# Patient Record
Sex: MALE | Race: OTHER | Employment: OTHER | URBAN - METROPOLITAN AREA
[De-identification: names, ages, dates, MRNs, and addresses within clinical notes are randomized per-mention and may not be internally consistent; named-entity substitution may affect disease eponyms.]

---

## 2017-02-07 ENCOUNTER — TELEPHONE (OUTPATIENT)
Dept: NEUROLOGY | Facility: CLINIC | Age: 67
End: 2017-02-07

## 2017-02-07 ENCOUNTER — OFFICE VISIT (OUTPATIENT)
Dept: NEUROLOGY | Facility: CLINIC | Age: 67
End: 2017-02-07

## 2017-02-07 VITALS
BODY MASS INDEX: 30.21 KG/M2 | SYSTOLIC BLOOD PRESSURE: 120 MMHG | RESPIRATION RATE: 16 BRPM | WEIGHT: 211 LBS | HEIGHT: 70 IN | DIASTOLIC BLOOD PRESSURE: 74 MMHG

## 2017-02-07 DIAGNOSIS — G44.86 CERVICOGENIC HEADACHE: Primary | ICD-10-CM

## 2017-02-07 PROCEDURE — 99213 OFFICE O/P EST LOW 20 MIN: CPT | Performed by: OTHER

## 2017-02-07 RX ORDER — ALFUZOSIN HYDROCHLORIDE 10 MG/1
TABLET, EXTENDED RELEASE ORAL NIGHTLY
Refills: 1 | COMMUNITY
Start: 2017-01-18

## 2017-02-07 RX ORDER — TIZANIDINE 2 MG/1
2 TABLET ORAL 3 TIMES DAILY
Qty: 90 TABLET | Refills: 0 | Status: SHIPPED | OUTPATIENT
Start: 2017-02-07 | End: 2017-05-08

## 2017-02-07 RX ORDER — SUMATRIPTAN 100 MG/1
TABLET, FILM COATED ORAL
Refills: 0 | COMMUNITY
Start: 2017-02-06 | End: 2017-02-07

## 2017-02-07 NOTE — PATIENT INSTRUCTIONS
Refill policies:    • Allow 2 business days for refills; controlled substances may take longer. • Contact our office at least 5 days prior to running out of medication or submit request through the “request refill” option in your [employer] account.   • Ref have a procedure or additional testing performed. [facility]) will contact your insurance carrier to obtain pre-certification or prior authorization.     Unfortunately, [facility] has seen an increase in denial of payment even though the p

## 2017-02-07 NOTE — TELEPHONE ENCOUNTER
[address]. for authorization of approval of Botox 200 units x 1 vial.  Talked to [address] [place] [address]. who states Botox is not on their formulary and is not a covered benefit under Part D.(Part B has covered Botox 200 units  and cpt code 13446 in the

## 2017-02-07 NOTE — PROGRESS NOTES
[facility]   Follow-Up Exam Note  Twin Castro MD        Patient is here for follow-up of headaches. Unfortunately after several rounds of botulinum toxin he continues to have posterior headaches.   He tells me that he gets relief wit

## 2017-05-08 ENCOUNTER — OFFICE VISIT (OUTPATIENT)
Dept: NEUROLOGY | Facility: CLINIC | Age: 67
End: 2017-05-08

## 2017-05-08 VITALS
WEIGHT: 203 LBS | HEIGHT: 70 IN | RESPIRATION RATE: 16 BRPM | SYSTOLIC BLOOD PRESSURE: 124 MMHG | HEART RATE: 84 BPM | DIASTOLIC BLOOD PRESSURE: 76 MMHG | BODY MASS INDEX: 29.06 KG/M2

## 2017-05-08 DIAGNOSIS — G44.221 CHRONIC TENSION-TYPE HEADACHE, INTRACTABLE: Primary | ICD-10-CM

## 2017-05-08 PROCEDURE — 99213 OFFICE O/P EST LOW 20 MIN: CPT | Performed by: OTHER

## 2017-05-08 RX ORDER — BUTALBITAL, ACETAMINOPHEN AND CAFFEINE 50; 325; 40 MG/1; MG/1; MG/1
1 TABLET ORAL EVERY 6 HOURS PRN
Qty: 60 TABLET | Refills: 5 | Status: SHIPPED | OUTPATIENT
Start: 2017-05-08 | End: 2017-06-07

## 2017-05-08 RX ORDER — CYCLOBENZAPRINE HCL 5 MG
5 TABLET ORAL NIGHTLY
Qty: 30 TABLET | Refills: 12 | Status: SHIPPED | OUTPATIENT
Start: 2017-05-08 | End: 2018-05-31

## 2017-05-08 NOTE — PROGRESS NOTES
Doc Debra : 10/7/1950     HPI:   Patient presents with:  Headache: LOV: 17 with Dr. Arjun Bernardo. F/U on headaches. Patient states that he received 2 rounds of botox without any improvement in headaches.  He states that he had some relief with pain Erbumine 4 MG Oral Tab Take 4 mg by mouth daily. Disp:  Rfl:    JANUVIA 100 MG Oral Tab 1 tablet daily. Disp:  Rfl: 4     No current facility-administered medications for this visit.    Past Medical History   Diagnosis Date   • Type II or unspecified type d visual fields. Pupil react to light. Fundoscopic exam normal.  III,IV,VI- EOM full, with normal pursuit. V-Facial sensation intact, with symmetric corneal reflex. VII- face symmetric. VIII- Auditory acuity symmetric.     Motor: 5/5 strength in the upper an

## 2017-05-10 ENCOUNTER — TELEPHONE (OUTPATIENT)
Dept: NEUROLOGY | Facility: CLINIC | Age: 67
End: 2017-05-10

## 2017-06-05 ENCOUNTER — HOSPITAL ENCOUNTER (EMERGENCY)
Facility: HOSPITAL | Age: 67
Discharge: HOME | End: 2017-06-05
Attending: EMERGENCY MEDICINE
Payer: MEDICARE

## 2017-06-05 VITALS
WEIGHT: 205 LBS | BODY MASS INDEX: 29.35 KG/M2 | SYSTOLIC BLOOD PRESSURE: 147 MMHG | DIASTOLIC BLOOD PRESSURE: 87 MMHG | HEIGHT: 70 IN | RESPIRATION RATE: 16 BRPM | HEART RATE: 80 BPM | OXYGEN SATURATION: 98 %

## 2017-06-05 DIAGNOSIS — G89.29 CHRONIC INTRACTABLE HEADACHE, UNSPECIFIED HEADACHE TYPE: ICD-10-CM

## 2017-06-05 DIAGNOSIS — I10 ESSENTIAL HYPERTENSION: Primary | ICD-10-CM

## 2017-06-05 DIAGNOSIS — R51.9 CHRONIC INTRACTABLE HEADACHE, UNSPECIFIED HEADACHE TYPE: ICD-10-CM

## 2017-06-05 PROCEDURE — 36415 COLL VENOUS BLD VENIPUNCTURE: CPT

## 2017-06-05 PROCEDURE — 99283 EMERGENCY DEPT VISIT LOW MDM: CPT

## 2017-06-05 PROCEDURE — 80048 BASIC METABOLIC PNL TOTAL CA: CPT | Performed by: EMERGENCY MEDICINE

## 2017-06-05 PROCEDURE — 85025 COMPLETE CBC W/AUTO DIFF WBC: CPT | Performed by: EMERGENCY MEDICINE

## 2017-06-05 RX ORDER — AMLODIPINE BESYLATE 2.5 MG/1
2.5 TABLET ORAL DAILY
Qty: 30 TABLET | Refills: 0 | Status: SHIPPED | OUTPATIENT
Start: 2017-06-05 | End: 2017-07-05

## 2017-06-05 NOTE — ED INITIAL ASSESSMENT (HPI)
Pt presents to ED with a c/o \"high blood pressure\" since yesterday. Pt reports taking perindopril 4 mg at 0000 and another 4 mg dose at 0600. Pt states SBP was in the 170's. Pt currently c/o headache, denies feeling dizzy.

## 2017-06-05 NOTE — ED PROVIDER NOTES
Patient Seen in: [facility] AND [facility] Emergency Department    History   Patient presents with:  Hypertension (cardiovascular)    Stated Complaint: HTN    HPI    The patient is a 70-year-old male with a history of chronic daily headaches and hypertension.   Desiree Purpura agreed except as otherwise stated in HPI.     Physical Exam     ED Triage Vitals   BP 06/05/17 0816 165/95 mmHg   Pulse 06/05/17 0816 80   Resp 06/05/17 0816 98   Temp --    Temp src 06/05/17 0816 Temporal   SpO2 06/05/17 0816 98 %   O2 Device 06/05/17 0816 Abnormality         Status                     ---------                               -----------         ------                     CBC W/ DIFFERENTIAL[[id number]]                              Final result                 Please view resu

## 2017-12-06 ENCOUNTER — OFFICE VISIT (OUTPATIENT)
Dept: NEUROLOGY | Facility: CLINIC | Age: 67
End: 2017-12-06

## 2017-12-06 ENCOUNTER — TELEPHONE (OUTPATIENT)
Dept: NEUROLOGY | Facility: CLINIC | Age: 67
End: 2017-12-06

## 2017-12-06 VITALS
DIASTOLIC BLOOD PRESSURE: 70 MMHG | BODY MASS INDEX: 29.63 KG/M2 | HEART RATE: 76 BPM | RESPIRATION RATE: 16 BRPM | WEIGHT: 207 LBS | SYSTOLIC BLOOD PRESSURE: 106 MMHG | HEIGHT: 70 IN

## 2017-12-06 DIAGNOSIS — G44.221 CHRONIC TENSION-TYPE HEADACHE, INTRACTABLE: Primary | ICD-10-CM

## 2017-12-06 PROCEDURE — 99214 OFFICE O/P EST MOD 30 MIN: CPT | Performed by: OTHER

## 2017-12-06 PROCEDURE — 64405 NJX AA&/STRD GR OCPL NRV: CPT | Performed by: OTHER

## 2017-12-06 RX ORDER — TRIAMCINOLONE ACETONIDE 40 MG/ML
40 INJECTION, SUSPENSION INTRA-ARTICULAR; INTRAMUSCULAR ONCE
Status: COMPLETED | OUTPATIENT
Start: 2017-12-06 | End: 2017-12-06

## 2017-12-06 RX ORDER — LIDOCAINE HYDROCHLORIDE 10 MG/ML
1 INJECTION, SOLUTION INFILTRATION; PERINEURAL ONCE
Status: COMPLETED | OUTPATIENT
Start: 2017-12-06 | End: 2017-12-06

## 2017-12-06 RX ORDER — ERGOCALCIFEROL 1.25 MG/1
1 CAPSULE ORAL
Refills: 0 | COMMUNITY
Start: 2017-11-13 | End: 2020-02-26

## 2017-12-06 RX ORDER — AMLODIPINE BESYLATE 5 MG/1
1 TABLET ORAL DAILY
Refills: 1 | COMMUNITY
Start: 2017-09-14

## 2017-12-06 NOTE — TELEPHONE ENCOUNTER
Medicare Online for insurance coverage of  bilateral occipital nerve block cpt code 79302. Insurance was verified and procedure is a covered benefit and does not require authorization. Will inform Nursing.

## 2017-12-06 NOTE — PROGRESS NOTES
Neurology Follow up Visit     Referred By: Dr. Ramirez ref. provider found    Chief Complaint: Patient presents with:  Headache: LOV: 5/8/17 with Dr. Ivar Councilman. F/U on migraines. Patient states that his headaches are about the same.  He states that he gets 1-2 without improvement. He has been on a variety of medications including Zanaflex, Cymbalta, gabapentin, Elavil, and Depakote in the past.  Topamax was not tried because he does have a history of kidney stones. He denies any focal neurologic complaints. no cyanosis or edema     Neurological:     Mental Status- Alert and oriented x3.   Normal attention span and concentration  Thought process intact  Memory intact- recent and remote  Mood intact  Fund of knowledge appropriate for education and age    Iván Mathis degenerative changes as compared to 2014    Assessment   1. Chronic tension-type headache, intractable  Since the could be some occipital neuralgia component, occipital nerve blocks were performed bilaterally.   I did greater occipital nerve block on both s

## 2018-02-05 ENCOUNTER — TELEPHONE (OUTPATIENT)
Dept: NEUROLOGY | Facility: CLINIC | Age: 68
End: 2018-02-05

## 2018-03-06 ENCOUNTER — OFFICE VISIT (OUTPATIENT)
Dept: NEUROLOGY | Facility: CLINIC | Age: 68
End: 2018-03-06

## 2018-03-06 VITALS
WEIGHT: 207 LBS | RESPIRATION RATE: 16 BRPM | HEART RATE: 85 BPM | SYSTOLIC BLOOD PRESSURE: 100 MMHG | BODY MASS INDEX: 29.63 KG/M2 | HEIGHT: 70 IN | DIASTOLIC BLOOD PRESSURE: 64 MMHG

## 2018-03-06 DIAGNOSIS — G43.719 INTRACTABLE CHRONIC MIGRAINE WITHOUT AURA AND WITHOUT STATUS MIGRAINOSUS: ICD-10-CM

## 2018-03-06 DIAGNOSIS — M54.81 BILATERAL OCCIPITAL NEURALGIA: Primary | ICD-10-CM

## 2018-03-06 PROCEDURE — 99213 OFFICE O/P EST LOW 20 MIN: CPT | Performed by: OTHER

## 2018-03-06 RX ORDER — PREGABALIN 75 MG/1
75 CAPSULE ORAL 2 TIMES DAILY
Qty: 60 CAPSULE | Refills: 5 | Status: SHIPPED | OUTPATIENT
Start: 2018-03-06 | End: 2018-04-05

## 2018-03-06 RX ORDER — SAXAGLIPTIN 5 MG/1
TABLET, FILM COATED ORAL
Refills: 1 | COMMUNITY
Start: 2018-02-18

## 2018-03-06 NOTE — PATIENT INSTRUCTIONS
As of October 6th 2014, the Drug Enforcement Agency [facility]) is reclassifying all hydrocodone combination medications from Schedule III to Schedule II. This includes medications such as Norco, Vicodin, Lortab, Zohydro, and Vicoprofen.     What this means for y

## 2018-03-06 NOTE — PROGRESS NOTES
Francis Fagan : 10/7/1950     HPI:   Patient presents with:  Headache: LOV: 17. Patient presents for a f/u headaches. Patient states there has not been any changes for his headaches. He states he now has a couple headaches a day.  Denies a head every 7 days. Disp:  Rfl: 0   Cyclobenzaprine HCl 5 MG Oral Tab Take 1 tablet (5 mg total) by mouth nightly.  Disp: 30 tablet Rfl: 12   Alfuzosin HCl ER 10 MG Oral Tablet 24 Hr TK 1 T PO D Disp:  Rfl: 1   Perindopril Erbumine 4 MG Oral Tab Take 4 mg by mout extremities  PSYCHE:no depression or anxiety  NEURO: As in HPI    EXAM:     Vitals:  /64 (BP Location: Right arm, Patient Position: Sitting, Cuff Size: adult)   Pulse 85   Resp 16   Ht 70\"   Wt 207 lb   BMI 29.70 kg/m²    General appearance:  In no d

## 2018-05-31 RX ORDER — CYCLOBENZAPRINE HCL 5 MG
TABLET ORAL
Qty: 30 TABLET | Refills: 5 | Status: SHIPPED | OUTPATIENT
Start: 2018-05-31 | End: 2020-02-26

## 2018-05-31 NOTE — TELEPHONE ENCOUNTER
Refill request for cyclobenzaprine 5 mg, take 1 tab nightly, #30, 5 refills    LOV: 3/6/18  NOV: None

## 2018-06-04 NOTE — TELEPHONE ENCOUNTER
PA for fioricet completed through cover my meds. Determination will be faxed to office within 72 hrs. Tried calling patient back, could not reach patient. Patient left message on PSR phone to see if he could talk to a nurse about having urinary issues as well as make an appointment.  Please call patient to advise.

## 2020-01-24 PROBLEM — K43.6 VENTRAL HERNIA WITH OBSTRUCTION AND WITHOUT GANGRENE: Status: ACTIVE | Noted: 2020-01-24

## 2020-01-24 PROBLEM — K40.20 NON-RECURRENT BILATERAL INGUINAL HERNIA WITHOUT OBSTRUCTION OR GANGRENE: Status: ACTIVE | Noted: 2020-01-24

## 2020-02-26 RX ORDER — ACETAMINOPHEN 325 MG/1
325 TABLET ORAL EVERY 6 HOURS PRN
COMMUNITY

## 2020-02-26 RX ORDER — MULTIVIT-MIN/IRON/FOLIC ACID/K 18-600-40
1 CAPSULE ORAL DAILY
COMMUNITY

## 2020-02-27 ENCOUNTER — HOSPITAL ENCOUNTER (OUTPATIENT)
Dept: CV DIAGNOSTICS | Facility: HOSPITAL | Age: 70
Discharge: HOME | End: 2020-02-27
Attending: INTERNAL MEDICINE
Payer: MEDICARE

## 2020-02-27 ENCOUNTER — HOSPITAL ENCOUNTER (OUTPATIENT)
Dept: NUCLEAR MEDICINE | Facility: HOSPITAL | Age: 70
Discharge: HOME | End: 2020-02-27
Attending: INTERNAL MEDICINE
Payer: MEDICARE

## 2020-02-27 DIAGNOSIS — R94.31 ABNORMAL EKG: ICD-10-CM

## 2020-02-27 PROCEDURE — 93016 CV STRESS TEST SUPVJ ONLY: CPT | Performed by: INTERNAL MEDICINE

## 2020-02-27 PROCEDURE — 93017 CV STRESS TEST TRACING ONLY: CPT | Performed by: INTERNAL MEDICINE

## 2020-02-27 PROCEDURE — 93018 CV STRESS TEST I&R ONLY: CPT | Performed by: INTERNAL MEDICINE

## 2020-02-27 PROCEDURE — 78452 HT MUSCLE IMAGE SPECT MULT: CPT | Performed by: INTERNAL MEDICINE

## 2020-03-05 ENCOUNTER — ANESTHESIA EVENT (OUTPATIENT)
Dept: SURGERY | Facility: HOSPITAL | Age: 70
End: 2020-03-05
Payer: MEDICARE

## 2020-03-05 ENCOUNTER — ANESTHESIA (OUTPATIENT)
Dept: SURGERY | Facility: HOSPITAL | Age: 70
End: 2020-03-05
Payer: MEDICARE

## 2020-03-05 ENCOUNTER — HOSPITAL ENCOUNTER (OUTPATIENT)
Facility: HOSPITAL | Age: 70
Setting detail: HOSPITAL OUTPATIENT SURGERY
Discharge: HOME | End: 2020-03-05
Attending: SURGERY | Admitting: SURGERY
Payer: MEDICARE

## 2020-03-05 VITALS
SYSTOLIC BLOOD PRESSURE: 141 MMHG | RESPIRATION RATE: 18 BRPM | WEIGHT: 212 LBS | TEMPERATURE: 98 F | HEIGHT: 70 IN | HEART RATE: 101 BPM | DIASTOLIC BLOOD PRESSURE: 80 MMHG | OXYGEN SATURATION: 95 % | BODY MASS INDEX: 30.35 KG/M2

## 2020-03-05 LAB
GLUCOSE BLDC GLUCOMTR-MCNC: 105 MG/DL (ref 70–99)
GLUCOSE BLDC GLUCOMTR-MCNC: 124 MG/DL (ref 70–99)

## 2020-03-05 PROCEDURE — 0YUA4JZ SUPPLEMENT BILATERAL INGUINAL REGION WITH SYNTHETIC SUBSTITUTE, PERCUTANEOUS ENDOSCOPIC APPROACH: ICD-10-PCS | Performed by: SURGERY

## 2020-03-05 PROCEDURE — 8E0W4CZ ROBOTIC ASSISTED PROCEDURE OF TRUNK REGION, PERCUTANEOUS ENDOSCOPIC APPROACH: ICD-10-PCS | Performed by: SURGERY

## 2020-03-05 PROCEDURE — 82962 GLUCOSE BLOOD TEST: CPT

## 2020-03-05 PROCEDURE — 0WUF0JZ SUPPLEMENT ABDOMINAL WALL WITH SYNTHETIC SUBSTITUTE, OPEN APPROACH: ICD-10-PCS | Performed by: SURGERY

## 2020-03-05 DEVICE — VENTRALEX ST HERNIA PATCH
Type: IMPLANTABLE DEVICE | Site: ABDOMEN | Status: FUNCTIONAL
Brand: VENTRALEX ST HERNIA PATCH

## 2020-03-05 DEVICE — PROGRIP MESH: Type: IMPLANTABLE DEVICE | Site: ABDOMEN | Status: FUNCTIONAL

## 2020-03-05 RX ORDER — ROCURONIUM BROMIDE 10 MG/ML
INJECTION, SOLUTION INTRAVENOUS AS NEEDED
Status: DISCONTINUED | OUTPATIENT
Start: 2020-03-05 | End: 2020-03-05 | Stop reason: SURG

## 2020-03-05 RX ORDER — TRAMADOL HYDROCHLORIDE 50 MG/1
50 TABLET ORAL EVERY 6 HOURS PRN
Qty: 20 TABLET | Refills: 0 | Status: SHIPPED | OUTPATIENT
Start: 2020-03-05 | End: 2020-03-13

## 2020-03-05 RX ORDER — TRAMADOL HYDROCHLORIDE 50 MG/1
100 TABLET ORAL EVERY 6 HOURS PRN
Status: CANCELLED | OUTPATIENT
Start: 2020-03-05

## 2020-03-05 RX ORDER — ACETAMINOPHEN 500 MG
1000 TABLET ORAL ONCE
Status: COMPLETED | OUTPATIENT
Start: 2020-03-05 | End: 2020-03-05

## 2020-03-05 RX ORDER — HYDROCODONE BITARTRATE AND ACETAMINOPHEN 5; 325 MG/1; MG/1
1 TABLET ORAL AS NEEDED
Status: DISCONTINUED | OUTPATIENT
Start: 2020-03-05 | End: 2020-03-05

## 2020-03-05 RX ORDER — ONDANSETRON 2 MG/ML
4 INJECTION INTRAMUSCULAR; INTRAVENOUS ONCE AS NEEDED
Status: COMPLETED | OUTPATIENT
Start: 2020-03-05 | End: 2020-03-05

## 2020-03-05 RX ORDER — ONDANSETRON 2 MG/ML
4 INJECTION INTRAMUSCULAR; INTRAVENOUS EVERY 6 HOURS PRN
Status: CANCELLED | OUTPATIENT
Start: 2020-03-05

## 2020-03-05 RX ORDER — NALOXONE HYDROCHLORIDE 0.4 MG/ML
80 INJECTION, SOLUTION INTRAMUSCULAR; INTRAVENOUS; SUBCUTANEOUS AS NEEDED
Status: DISCONTINUED | OUTPATIENT
Start: 2020-03-05 | End: 2020-03-05

## 2020-03-05 RX ORDER — DEXAMETHASONE SODIUM PHOSPHATE 4 MG/ML
VIAL (ML) INJECTION AS NEEDED
Status: DISCONTINUED | OUTPATIENT
Start: 2020-03-05 | End: 2020-03-05 | Stop reason: SURG

## 2020-03-05 RX ORDER — DEXTROSE MONOHYDRATE 25 G/50ML
50 INJECTION, SOLUTION INTRAVENOUS
Status: DISCONTINUED | OUTPATIENT
Start: 2020-03-05 | End: 2020-03-05

## 2020-03-05 RX ORDER — NEOSTIGMINE METHYLSULFATE 1 MG/ML
INJECTION INTRAVENOUS AS NEEDED
Status: DISCONTINUED | OUTPATIENT
Start: 2020-03-05 | End: 2020-03-05 | Stop reason: SURG

## 2020-03-05 RX ORDER — BUPIVACAINE HYDROCHLORIDE AND EPINEPHRINE 2.5; 5 MG/ML; UG/ML
INJECTION, SOLUTION INFILTRATION; PERINEURAL AS NEEDED
Status: DISCONTINUED | OUTPATIENT
Start: 2020-03-05 | End: 2020-03-05 | Stop reason: HOSPADM

## 2020-03-05 RX ORDER — GLYCOPYRROLATE 0.2 MG/ML
INJECTION, SOLUTION INTRAMUSCULAR; INTRAVENOUS AS NEEDED
Status: DISCONTINUED | OUTPATIENT
Start: 2020-03-05 | End: 2020-03-05 | Stop reason: SURG

## 2020-03-05 RX ORDER — METOCLOPRAMIDE 10 MG/1
10 TABLET ORAL ONCE
Status: COMPLETED | OUTPATIENT
Start: 2020-03-05 | End: 2020-03-05

## 2020-03-05 RX ORDER — MORPHINE SULFATE 10 MG/ML
6 INJECTION, SOLUTION INTRAMUSCULAR; INTRAVENOUS EVERY 10 MIN PRN
Status: DISCONTINUED | OUTPATIENT
Start: 2020-03-05 | End: 2020-03-05

## 2020-03-05 RX ORDER — HYDROCODONE BITARTRATE AND ACETAMINOPHEN 5; 325 MG/1; MG/1
2 TABLET ORAL AS NEEDED
Status: DISCONTINUED | OUTPATIENT
Start: 2020-03-05 | End: 2020-03-05

## 2020-03-05 RX ORDER — ONDANSETRON 2 MG/ML
INJECTION INTRAMUSCULAR; INTRAVENOUS AS NEEDED
Status: DISCONTINUED | OUTPATIENT
Start: 2020-03-05 | End: 2020-03-05 | Stop reason: SURG

## 2020-03-05 RX ORDER — PROCHLORPERAZINE EDISYLATE 5 MG/ML
5 INJECTION INTRAMUSCULAR; INTRAVENOUS ONCE AS NEEDED
Status: COMPLETED | OUTPATIENT
Start: 2020-03-05 | End: 2020-03-05

## 2020-03-05 RX ORDER — LIDOCAINE HYDROCHLORIDE 10 MG/ML
INJECTION, SOLUTION EPIDURAL; INFILTRATION; INTRACAUDAL; PERINEURAL AS NEEDED
Status: DISCONTINUED | OUTPATIENT
Start: 2020-03-05 | End: 2020-03-05 | Stop reason: SURG

## 2020-03-05 RX ORDER — FAMOTIDINE 20 MG/1
20 TABLET ORAL ONCE
Status: COMPLETED | OUTPATIENT
Start: 2020-03-05 | End: 2020-03-05

## 2020-03-05 RX ORDER — HYDROMORPHONE HYDROCHLORIDE 1 MG/ML
0.6 INJECTION, SOLUTION INTRAMUSCULAR; INTRAVENOUS; SUBCUTANEOUS EVERY 5 MIN PRN
Status: DISCONTINUED | OUTPATIENT
Start: 2020-03-05 | End: 2020-03-05

## 2020-03-05 RX ORDER — METOCLOPRAMIDE HYDROCHLORIDE 5 MG/ML
10 INJECTION INTRAMUSCULAR; INTRAVENOUS EVERY 8 HOURS PRN
Status: CANCELLED | OUTPATIENT
Start: 2020-03-05

## 2020-03-05 RX ORDER — MORPHINE SULFATE 4 MG/ML
2 INJECTION, SOLUTION INTRAMUSCULAR; INTRAVENOUS EVERY 10 MIN PRN
Status: DISCONTINUED | OUTPATIENT
Start: 2020-03-05 | End: 2020-03-05

## 2020-03-05 RX ORDER — HALOPERIDOL 5 MG/ML
0.25 INJECTION INTRAMUSCULAR ONCE AS NEEDED
Status: DISCONTINUED | OUTPATIENT
Start: 2020-03-05 | End: 2020-03-05

## 2020-03-05 RX ORDER — HYDROMORPHONE HYDROCHLORIDE 1 MG/ML
0.4 INJECTION, SOLUTION INTRAMUSCULAR; INTRAVENOUS; SUBCUTANEOUS EVERY 5 MIN PRN
Status: DISCONTINUED | OUTPATIENT
Start: 2020-03-05 | End: 2020-03-05

## 2020-03-05 RX ORDER — MORPHINE SULFATE 4 MG/ML
4 INJECTION, SOLUTION INTRAMUSCULAR; INTRAVENOUS EVERY 10 MIN PRN
Status: DISCONTINUED | OUTPATIENT
Start: 2020-03-05 | End: 2020-03-05

## 2020-03-05 RX ORDER — CEFAZOLIN SODIUM/WATER 2 G/20 ML
2 SYRINGE (ML) INTRAVENOUS ONCE
Status: COMPLETED | OUTPATIENT
Start: 2020-03-05 | End: 2020-03-05

## 2020-03-05 RX ORDER — KETOROLAC TROMETHAMINE 30 MG/ML
30 INJECTION, SOLUTION INTRAMUSCULAR; INTRAVENOUS EVERY 6 HOURS PRN
Status: CANCELLED | OUTPATIENT
Start: 2020-03-05 | End: 2020-03-07

## 2020-03-05 RX ORDER — SODIUM CHLORIDE, SODIUM LACTATE, POTASSIUM CHLORIDE, CALCIUM CHLORIDE 600; 310; 30; 20 MG/100ML; MG/100ML; MG/100ML; MG/100ML
INJECTION, SOLUTION INTRAVENOUS CONTINUOUS
Status: DISCONTINUED | OUTPATIENT
Start: 2020-03-05 | End: 2020-03-05

## 2020-03-05 RX ORDER — HYDROMORPHONE HYDROCHLORIDE 1 MG/ML
0.2 INJECTION, SOLUTION INTRAMUSCULAR; INTRAVENOUS; SUBCUTANEOUS EVERY 5 MIN PRN
Status: DISCONTINUED | OUTPATIENT
Start: 2020-03-05 | End: 2020-03-05

## 2020-03-05 RX ORDER — KETOROLAC TROMETHAMINE 30 MG/ML
15 INJECTION, SOLUTION INTRAMUSCULAR; INTRAVENOUS EVERY 6 HOURS PRN
Status: CANCELLED | OUTPATIENT
Start: 2020-03-05 | End: 2020-03-07

## 2020-03-05 RX ORDER — DOCUSATE SODIUM 100 MG/1
100 CAPSULE, LIQUID FILLED ORAL 2 TIMES DAILY
Qty: 14 CAPSULE | Refills: 0 | Status: SHIPPED | OUTPATIENT
Start: 2020-03-05 | End: 2020-03-12

## 2020-03-05 RX ADMIN — SODIUM CHLORIDE, SODIUM LACTATE, POTASSIUM CHLORIDE, CALCIUM CHLORIDE: 600; 310; 30; 20 INJECTION, SOLUTION INTRAVENOUS at 14:56:00

## 2020-03-05 RX ADMIN — ROCURONIUM BROMIDE 20 MG: 10 INJECTION, SOLUTION INTRAVENOUS at 12:45:00

## 2020-03-05 RX ADMIN — SODIUM CHLORIDE, SODIUM LACTATE, POTASSIUM CHLORIDE, CALCIUM CHLORIDE: 600; 310; 30; 20 INJECTION, SOLUTION INTRAVENOUS at 13:57:00

## 2020-03-05 RX ADMIN — DEXAMETHASONE SODIUM PHOSPHATE 4 MG: 4 MG/ML VIAL (ML) INJECTION at 14:33:00

## 2020-03-05 RX ADMIN — GLYCOPYRROLATE 0.8 MG: 0.2 INJECTION, SOLUTION INTRAMUSCULAR; INTRAVENOUS at 14:36:00

## 2020-03-05 RX ADMIN — CEFAZOLIN SODIUM/WATER 2 G: 2 G/20 ML SYRINGE (ML) INTRAVENOUS at 12:04:00

## 2020-03-05 RX ADMIN — NEOSTIGMINE METHYLSULFATE 4.5 MG: 1 INJECTION INTRAVENOUS at 14:36:00

## 2020-03-05 RX ADMIN — LIDOCAINE HYDROCHLORIDE 25 MG: 10 INJECTION, SOLUTION EPIDURAL; INFILTRATION; INTRACAUDAL; PERINEURAL at 12:03:00

## 2020-03-05 RX ADMIN — ONDANSETRON 4 MG: 2 INJECTION INTRAMUSCULAR; INTRAVENOUS at 14:33:00

## 2020-03-05 NOTE — OR PREOP
Added allergies to pt list:  cipro and Augmentin, reviewed added allergies with pharmacist who notes Ancef safe to give to pt as ordered

## 2020-03-05 NOTE — CONSULTS
Laz Bueno is a 78year old  male. Patient presents with:  Consult: Consult [facility] CT disc & results     HPI:    The patient complains of Bilateral inguinal hernia. Signs and symptoms of hernia were first noticed 3 weeks ago.   The HEENT: denies nasal congestion, sinus pain or sore throat  RESPIRATORY: denies shortness of breath, wheezing or cough   CARDIOVASCULAR: denies chest pain or RIVAS; no palpitations   GI: denies nausea, vomiting, constipation, diarrhea; no rectal bleeding  GEN I had a lengthy discussion with the patient as to the etiology of bilateral inguinal hernias as well as incarcerated periumbilical/ventral hernia, as well as treatment options.   I discussed the risk of nonoperative management including the low risk of inc

## 2020-03-05 NOTE — ANESTHESIA POSTPROCEDURE EVALUATION
Patient: Christelle Dale    Procedure Summary     Date:  03/05/20 Room / Location:  [address] [place] [address] / [address] [place] [address] MAIN OR    Anesthesia Start:  7047 Anesthesia Stop:  5635    Procedures:        HERNIA VENTRAL REPAIR (N/A )      XI ROBOT-ASSISTED LAPAROSCOPIC ING

## 2020-03-05 NOTE — OPERATIVE REPORT
[facility]    PATIENT'S NAME: Lynn Martins   ATTENDING PHYSICIAN: Rolm Frankel., MD   OPERATING PHYSICIAN: Rolm Frankel., MD   PATIENT ACCOUNT#:   [de-identified]    LOCATION:  [facility] [address] PACU [facility] 10  MEDICAL RECORD #:   [id number] myocardial infarction, respiratory failure, renal failure, pulmonary embolus, DVT, and even death were all discussed in detail with the patient, who understands, consents, and wishes to proceed with the operation.       OPERATIVE TECHNIQUE:  Patient was bro preperitoneal fat were present in both groins. These were dissected free. The vas deferens and testicular vessels were preserved throughout the dissection. This extended into the retroperitoneum without difficulty.   Large direct hernia defects were note correct at the end of the case.       Dictated By Froilan Sanders MD  d: 03/05/2020 15:06:16  t: 03/05/2020 15:34:00  [facility] [id number]  MM/    cc: MD Kavitha Pickering MD

## 2020-03-05 NOTE — ANESTHESIA PROCEDURE NOTES
Airway  Urgency: Elective      General Information and Staff    Patient location during procedure: OR  Anesthesiologist: Hussein Bone MD  Performed: anesthesiologist     Indications and Patient Condition  Indications for airway management: anesthesia

## 2020-03-05 NOTE — H&P (VIEW-ONLY)
Signed             Ton Clancy is a 78year old  male. Patient presents with:  Consult: Consult [facility] CT disc & results     HPI:    The patient complains of Bilateral inguinal hernia. Signs and symptoms of hernia were first noticed 3 weeks ago.   The HEENT: denies nasal congestion, sinus pain or sore throat  RESPIRATORY: denies shortness of breath, wheezing or cough   CARDIOVASCULAR: denies chest pain or RIVAS; no palpitations   GI: denies nausea, vomiting, constipation, diarrhea; no rectal bleeding  GEN I had a lengthy discussion with the patient as to the etiology of bilateral inguinal hernias as well as incarcerated periumbilical/ventral hernia, as well as treatment options.   I discussed the risk of nonoperative management including the low risk of inc

## 2020-03-05 NOTE — BRIEF OP NOTE
Pre-Operative Diagnosis: bilateral inguinal hernia, ventral hernia     Post-Operative Diagnosis: bilateral incarcerated inguinal hernia, ventral hernia      Procedure Performed:   Procedure(s):  Robotic assisted laparoscopic incarcerated  bilateral inguina

## 2020-03-05 NOTE — INTERVAL H&P NOTE
Pre-op Diagnosis: bilateral inguinal hernia, ventral hernia    The above referenced H&P was reviewed by Magy Purvis MD on 3/5/2020, the patient was examined and no significant changes have occurred in the patient's condition since the H&P was perfor

## 2020-03-05 NOTE — ANESTHESIA PREPROCEDURE EVALUATION
Anesthesia PreOp Note    HPI:     Chester Jhaveri is a 78year old male who presents for preoperative consultation requested by: Alex Johnson MD    Date of Surgery: 3/5/2020    Procedure(s):  ROBOT-ASSISTED LAPAROSCOPIC INGUINAL HERNIA REPAIR  H GARLIC OR, Take 1 capsule by mouth daily. , Disp: , Rfl: , 2/23/2020  ONGLYZA 5 MG Oral Tab, TK 1 T PO QD, Disp: , Rfl: 1, 3/4/2020 at 700  AmLODIPine Besylate 5 MG Oral Tab, 1 tablet daily. , Disp: , Rfl: 1, 3/5/2020 at 500  Alfuzosin HCl ER 10 MG Oral Tabl Smokeless tobacco: Never Used    Substance and Sexual Activity      Alcohol use: No      Drug use: No      Sexual activity: Not on file    Lifestyle      Physical activity:        Days per week: Not on file        Minutes per session: Not on file Weight: 96.2 kg (212 lb) 96.2 kg (212 lb)   Height: 1.778 m (5' 10\")         Anesthesia Evaluation     Patient summary reviewed and Nursing notes reviewed    Airway   Mallampati: III  TM distance: >3 FB  Neck ROM: full  Dental    (+) implants        Pulmo

## 2021-03-05 DIAGNOSIS — Z23 NEED FOR VACCINATION: ICD-10-CM

## 2021-11-08 ENCOUNTER — HOSPITAL ENCOUNTER (EMERGENCY)
Facility: HOSPITAL | Age: 71
Discharge: HOME | End: 2021-11-08
Attending: EMERGENCY MEDICINE
Payer: MEDICARE

## 2021-11-08 VITALS
TEMPERATURE: 99 F | SYSTOLIC BLOOD PRESSURE: 149 MMHG | OXYGEN SATURATION: 97 % | DIASTOLIC BLOOD PRESSURE: 82 MMHG | WEIGHT: 220 LBS | RESPIRATION RATE: 18 BRPM | HEART RATE: 98 BPM | BODY MASS INDEX: 31.5 KG/M2 | HEIGHT: 70 IN

## 2021-11-08 DIAGNOSIS — R19.7 DIARRHEA OF PRESUMED INFECTIOUS ORIGIN: Primary | ICD-10-CM

## 2021-11-08 PROCEDURE — 87045 FECES CULTURE AEROBIC BACT: CPT | Performed by: EMERGENCY MEDICINE

## 2021-11-08 PROCEDURE — 87493 C DIFF AMPLIFIED PROBE: CPT | Performed by: EMERGENCY MEDICINE

## 2021-11-08 PROCEDURE — 87427 SHIGA-LIKE TOXIN AG IA: CPT | Performed by: EMERGENCY MEDICINE

## 2021-11-08 PROCEDURE — 87046 STOOL CULTR AEROBIC BACT EA: CPT | Performed by: EMERGENCY MEDICINE

## 2021-11-08 PROCEDURE — 85025 COMPLETE CBC W/AUTO DIFF WBC: CPT | Performed by: EMERGENCY MEDICINE

## 2021-11-08 PROCEDURE — 82272 OCCULT BLD FECES 1-3 TESTS: CPT | Performed by: EMERGENCY MEDICINE

## 2021-11-08 PROCEDURE — 80048 BASIC METABOLIC PNL TOTAL CA: CPT | Performed by: EMERGENCY MEDICINE

## 2021-11-08 PROCEDURE — 99283 EMERGENCY DEPT VISIT LOW MDM: CPT

## 2021-11-08 PROCEDURE — 87077 CULTURE AEROBIC IDENTIFY: CPT | Performed by: EMERGENCY MEDICINE

## 2021-11-08 PROCEDURE — 87040 BLOOD CULTURE FOR BACTERIA: CPT | Performed by: EMERGENCY MEDICINE

## 2021-11-08 PROCEDURE — 36415 COLL VENOUS BLD VENIPUNCTURE: CPT

## 2021-11-08 PROCEDURE — 82962 GLUCOSE BLOOD TEST: CPT

## 2021-11-08 RX ORDER — ACETAMINOPHEN 500 MG
1000 TABLET ORAL ONCE
Status: DISCONTINUED | OUTPATIENT
Start: 2021-11-08 | End: 2021-11-08

## 2021-11-08 NOTE — ED PROVIDER NOTES
Patient Seen in: [facility] AND [facility] Emergency Department      History   Patient presents with:  Abdomen/Flank Pain  Diarrhea    Stated Complaint: diarrhea    Subjective:   HPI    Patient is a 65-year-old male who presents with 2 days of watery nonbloody d RRR, no murmurs  Resp: CTAB, no wheezes or retractions  Ab: soft, nontender, no distension  Extremities: FROM of all extremities, no cyanosis/clubbing/edema  Neuro: CN intact, normal speech, normal gait, 5/5 motor strength in all extremities, no focal defi evidence of sepsis. Patient states his temp was as high as 103 in last two days. Blood cultures pending. Stool pending. Pt feels comfortable going home. We discussed that diarrhea may be viral or bacterial and he should f/u with PCP.  Advised return for wor

## 2022-01-03 ENCOUNTER — TELEPHONE (OUTPATIENT)
Dept: PHYSICAL MEDICINE AND REHAB | Facility: CLINIC | Age: 72
End: 2022-01-03

## 2022-01-03 NOTE — TELEPHONE ENCOUNTER
Patient has upcoming appointment 1/24/22 @ 10:30 am.    Patient informed of the above. Nothing further needed at this time.

## 2022-09-28 ENCOUNTER — APPOINTMENT (OUTPATIENT)
Dept: URBAN - METROPOLITAN AREA CLINIC 244 | Age: 72
Setting detail: DERMATOLOGY
End: 2022-09-29

## 2022-09-28 DIAGNOSIS — B37.2 CANDIDIASIS OF SKIN AND NAIL: ICD-10-CM

## 2022-09-28 PROCEDURE — OTHER PRESCRIPTION: OTHER

## 2022-09-28 PROCEDURE — 99203 OFFICE O/P NEW LOW 30 MIN: CPT

## 2022-09-28 PROCEDURE — OTHER COUNSELING: OTHER

## 2022-09-28 RX ORDER — KETOCONAZOLE 20 MG/G
CREAM TOPICAL
Qty: 30 | Refills: 0 | Status: ERX | COMMUNITY
Start: 2022-09-28

## 2022-09-28 ASSESSMENT — LOCATION DETAILED DESCRIPTION DERM
LOCATION DETAILED: RIGHT ANTERIOR PROXIMAL THIGH
LOCATION DETAILED: LEFT ANTERIOR PROXIMAL THIGH
LOCATION DETAILED: RIGHT GLUTEAL CREASE

## 2022-09-28 ASSESSMENT — LOCATION SIMPLE DESCRIPTION DERM
LOCATION SIMPLE: LEFT THIGH
LOCATION SIMPLE: RIGHT GLUTEAL CREASE
LOCATION SIMPLE: RIGHT THIGH

## 2022-09-28 ASSESSMENT — LOCATION ZONE DERM: LOCATION ZONE: LEG

## 2022-10-03 ENCOUNTER — APPOINTMENT (OUTPATIENT)
Dept: URBAN - METROPOLITAN AREA CLINIC 244 | Age: 72
Setting detail: DERMATOLOGY
End: 2022-10-03

## 2022-10-03 DIAGNOSIS — B37.2 CANDIDIASIS OF SKIN AND NAIL: ICD-10-CM

## 2022-10-03 PROCEDURE — OTHER PRESCRIPTION MEDICATION MANAGEMENT: OTHER

## 2022-10-03 PROCEDURE — 99213 OFFICE O/P EST LOW 20 MIN: CPT

## 2022-10-03 PROCEDURE — OTHER COUNSELING: OTHER

## 2022-10-03 PROCEDURE — OTHER PRESCRIPTION: OTHER

## 2022-10-03 RX ORDER — KETOCONAZOLE 20 MG/G
CREAM TOPICAL
Qty: 60 | Refills: 2 | Status: ERX

## 2022-10-03 ASSESSMENT — LOCATION ZONE DERM: LOCATION ZONE: LEG

## 2022-10-03 ASSESSMENT — LOCATION DETAILED DESCRIPTION DERM
LOCATION DETAILED: RIGHT GLUTEAL CREASE
LOCATION DETAILED: RIGHT ANTERIOR PROXIMAL THIGH
LOCATION DETAILED: LEFT ANTERIOR PROXIMAL THIGH

## 2022-10-03 ASSESSMENT — LOCATION SIMPLE DESCRIPTION DERM
LOCATION SIMPLE: RIGHT GLUTEAL CREASE
LOCATION SIMPLE: RIGHT THIGH
LOCATION SIMPLE: LEFT THIGH

## 2022-10-03 NOTE — PROCEDURE: PRESCRIPTION MEDICATION MANAGEMENT
Continue Regimen: ketoconazole 2 % topical cream \\nSig: Apply to AA BID
Plan: Recommended mild soap -like Dove. Vanicream Wash.
Detail Level: Simple
Render In Strict Bullet Format?: No

## 2022-10-12 ENCOUNTER — APPOINTMENT (OUTPATIENT)
Dept: URBAN - METROPOLITAN AREA CLINIC 244 | Age: 72
Setting detail: DERMATOLOGY
End: 2022-10-12

## 2022-10-12 DIAGNOSIS — B37.2 CANDIDIASIS OF SKIN AND NAIL: ICD-10-CM

## 2022-10-12 PROCEDURE — 99213 OFFICE O/P EST LOW 20 MIN: CPT

## 2022-10-12 PROCEDURE — OTHER PRESCRIPTION MEDICATION MANAGEMENT: OTHER

## 2022-10-12 PROCEDURE — OTHER COUNSELING: OTHER

## 2022-10-12 PROCEDURE — OTHER PRESCRIPTION: OTHER

## 2022-10-12 RX ORDER — PIMECROLIMUS 10 MG/G
CREAM TOPICAL
Qty: 200 | Refills: 1 | Status: ERX

## 2022-10-12 RX ORDER — KETOCONAZOLE 20 MG/G
CREAM TOPICAL
Qty: 60 | Refills: 2 | Status: ERX

## 2022-10-12 RX ORDER — PIMECROLIMUS 10 MG/G
CREAM TOPICAL
Qty: 100 | Refills: 2 | Status: ERX | COMMUNITY
Start: 2022-10-12

## 2022-10-12 ASSESSMENT — LOCATION ZONE DERM: LOCATION ZONE: LEG

## 2022-10-12 ASSESSMENT — LOCATION DETAILED DESCRIPTION DERM
LOCATION DETAILED: LEFT ANTERIOR PROXIMAL THIGH
LOCATION DETAILED: RIGHT ANTERIOR PROXIMAL THIGH
LOCATION DETAILED: RIGHT GLUTEAL CREASE

## 2022-10-12 ASSESSMENT — LOCATION SIMPLE DESCRIPTION DERM
LOCATION SIMPLE: LEFT THIGH
LOCATION SIMPLE: RIGHT GLUTEAL CREASE
LOCATION SIMPLE: RIGHT THIGH

## 2022-10-12 NOTE — PROCEDURE: PRESCRIPTION MEDICATION MANAGEMENT
Plan: Recommended mild soap -like Dove. Vanicream Wash.
Detail Level: Simple
Continue Regimen: ketoconazole 2 % topical cream \\nSig: Apply to AA BID
Render In Strict Bullet Format?: No

## 2022-10-12 NOTE — PROCEDURE: COUNSELING
Patient Specific Counseling (Will Not Stick From Patient To Patient): Nice improvement - add Elidel for R inguinal fold.  Advised pt to be patient and should clear in next few weeks
Detail Level: Simple

## 2022-10-26 ENCOUNTER — RX ONLY (RX ONLY)
Age: 72
End: 2022-10-26

## 2022-10-26 ENCOUNTER — APPOINTMENT (OUTPATIENT)
Dept: URBAN - METROPOLITAN AREA CLINIC 244 | Age: 72
Setting detail: DERMATOLOGY
End: 2022-10-28

## 2022-10-26 DIAGNOSIS — R21 RASH AND OTHER NONSPECIFIC SKIN ERUPTION: ICD-10-CM

## 2022-10-26 DIAGNOSIS — B37.2 CANDIDIASIS OF SKIN AND NAIL: ICD-10-CM

## 2022-10-26 PROCEDURE — 99213 OFFICE O/P EST LOW 20 MIN: CPT

## 2022-10-26 PROCEDURE — OTHER PRESCRIPTION MEDICATION MANAGEMENT: OTHER

## 2022-10-26 PROCEDURE — OTHER PRESCRIPTION: OTHER

## 2022-10-26 PROCEDURE — OTHER KOH PREP: OTHER

## 2022-10-26 PROCEDURE — OTHER COUNSELING: OTHER

## 2022-10-26 RX ORDER — KETOCONAZOLE 20 MG/G
CREAM TOPICAL
Qty: 60 | Refills: 2 | Status: ERX

## 2022-10-26 RX ORDER — PIMECROLIMUS 10 MG/G
CREAM TOPICAL
Qty: 100 | Refills: 2 | Status: ACTIVE

## 2022-10-26 ASSESSMENT — LOCATION SIMPLE DESCRIPTION DERM
LOCATION SIMPLE: RIGHT GLUTEAL CREASE
LOCATION SIMPLE: RIGHT THIGH
LOCATION SIMPLE: RIGHT AXILLARY VAULT
LOCATION SIMPLE: LEFT THIGH

## 2022-10-26 ASSESSMENT — LOCATION DETAILED DESCRIPTION DERM
LOCATION DETAILED: RIGHT AXILLARY VAULT
LOCATION DETAILED: LEFT ANTERIOR PROXIMAL THIGH
LOCATION DETAILED: RIGHT ANTERIOR PROXIMAL THIGH
LOCATION DETAILED: RIGHT GLUTEAL CREASE

## 2022-10-26 ASSESSMENT — LOCATION ZONE DERM
LOCATION ZONE: AXILLAE
LOCATION ZONE: LEG

## 2022-10-26 NOTE — PROCEDURE: PRESCRIPTION MEDICATION MANAGEMENT
Render In Strict Bullet Format?: No
Detail Level: Simple
Plan: Ketoconazole Cream 2%\\n\\nPimecrolimus 1% Cream
Continue Regimen: ketoconazole 2 % topical cream \\nSig: Apply to AA BID\\n\\npimecrolimus 1 % topical cream \\nSig: Apply to AA QAM
Plan: Recommended mild soap -like Dove. Vanicream Wash.

## 2022-10-26 NOTE — PROCEDURE: KOH PREP
Koh Procedure Text (Tissue Harvesting Technique): A 15-blade scalpel was used to scrape the skin. The skin scrapings were placed on a glass slide, covered with a coverslip and a KOH solution was applied.
Koh Intro Text (From The.....): A KOH prep was ordered and evaluated from the
Showing: no pathologic findings
Cpt Desired: 
Detail Level: Simple

## 2022-11-30 ENCOUNTER — APPOINTMENT (OUTPATIENT)
Dept: URBAN - METROPOLITAN AREA CLINIC 244 | Age: 72
Setting detail: DERMATOLOGY
End: 2022-11-30

## 2022-11-30 DIAGNOSIS — B37.2 CANDIDIASIS OF SKIN AND NAIL: ICD-10-CM

## 2022-11-30 DIAGNOSIS — L82.1 OTHER SEBORRHEIC KERATOSIS: ICD-10-CM

## 2022-11-30 PROCEDURE — OTHER COUNSELING: OTHER

## 2022-11-30 PROCEDURE — 99213 OFFICE O/P EST LOW 20 MIN: CPT

## 2022-11-30 PROCEDURE — OTHER PRESCRIPTION MEDICATION MANAGEMENT: OTHER

## 2022-11-30 PROCEDURE — OTHER PRESCRIPTION: OTHER

## 2022-11-30 RX ORDER — PIMECROLIMUS 10 MG/G
CREAM TOPICAL
Qty: 60 | Refills: 2 | Status: ERX

## 2022-11-30 RX ORDER — KETOCONAZOLE 20 MG/G
CREAM TOPICAL
Qty: 60 | Refills: 2 | Status: ERX

## 2022-11-30 RX ORDER — PIMECROLIMUS 10 MG/G
CREAM TOPICAL
Qty: 100 | Refills: 2 | Status: ERX

## 2022-11-30 ASSESSMENT — LOCATION DETAILED DESCRIPTION DERM
LOCATION DETAILED: RIGHT ANTERIOR PROXIMAL THIGH
LOCATION DETAILED: RIGHT GLUTEAL CREASE
LOCATION DETAILED: LEFT ANTERIOR PROXIMAL THIGH

## 2022-11-30 ASSESSMENT — LOCATION SIMPLE DESCRIPTION DERM
LOCATION SIMPLE: LEFT THIGH
LOCATION SIMPLE: RIGHT GLUTEAL CREASE
LOCATION SIMPLE: RIGHT THIGH

## 2022-11-30 ASSESSMENT — LOCATION ZONE DERM: LOCATION ZONE: LEG

## 2022-11-30 NOTE — PROCEDURE: PRESCRIPTION MEDICATION MANAGEMENT
Detail Level: Simple
Render In Strict Bullet Format?: No
Plan: Recommended mild soap -like Dove. Vanicream Wash.
Continue Regimen: ketoconazole 2 % topical cream \\nSig: Apply to AA BID\\n\\npimecrolimus 1 % topical cream \\nSig: Apply to AA QAM

## 2022-11-30 NOTE — PROCEDURE: COUNSELING
Detail Level: Simple
Patient Specific Counseling (Will Not Stick From Patient To Patient): Nice improvement - add Elidel for R inguinal fold.  Advised pt to be patient and should clear in next few weeks

## 2024-06-05 ENCOUNTER — MED REC SCAN ONLY (OUTPATIENT)
Dept: NEUROLOGY | Facility: CLINIC | Age: 74
End: 2024-06-05

## 2024-10-04 ENCOUNTER — HOSPITAL ENCOUNTER (OUTPATIENT)
Facility: HOSPITAL | Age: 74
Setting detail: OBSERVATION
LOS: 1 days | Discharge: HOME | End: 2024-10-05
Attending: EMERGENCY MEDICINE | Admitting: HOSPITALIST
Payer: MEDICARE

## 2024-10-04 DIAGNOSIS — K92.2 UPPER GASTROINTESTINAL HEMORRHAGE: Primary | ICD-10-CM

## 2024-10-04 PROBLEM — K92.1 MELENA: Status: ACTIVE | Noted: 2024-10-04

## 2024-10-04 LAB
ALBUMIN SERPL-MCNC: 4.2 G/DL (ref 3.2–4.8)
ALP LIVER SERPL-CCNC: 52 U/L
ALT SERPL-CCNC: 11 U/L
ANION GAP SERPL CALC-SCNC: 5 MMOL/L (ref 0–18)
ANTIBODY SCREEN: NEGATIVE
AST SERPL-CCNC: 11 U/L (ref ?–34)
BASOPHILS # BLD AUTO: 0.04 X10(3) UL (ref 0–0.2)
BASOPHILS NFR BLD AUTO: 0.3 %
BILIRUB DIRECT SERPL-MCNC: 0.2 MG/DL (ref ?–0.3)
BILIRUB SERPL-MCNC: 0.5 MG/DL (ref 0.2–1.1)
BUN BLD-MCNC: 39 MG/DL (ref 9–23)
BUN/CREAT SERPL: 35.5 (ref 10–20)
C DIFF TOX B STL QL: NEGATIVE
CALCIUM BLD-MCNC: 9 MG/DL (ref 8.7–10.4)
CHLORIDE SERPL-SCNC: 111 MMOL/L (ref 98–112)
CO2 SERPL-SCNC: 24 MMOL/L (ref 21–32)
CREAT BLD-MCNC: 1.1 MG/DL
DEPRECATED RDW RBC AUTO: 45.1 FL (ref 35.1–46.3)
EGFRCR SERPLBLD CKD-EPI 2021: 70 ML/MIN/1.73M2 (ref 60–?)
EOSINOPHIL # BLD AUTO: 0.03 X10(3) UL (ref 0–0.7)
EOSINOPHIL NFR BLD AUTO: 0.2 %
ERYTHROCYTE [DISTWIDTH] IN BLOOD BY AUTOMATED COUNT: 13.5 % (ref 11–15)
EST. AVERAGE GLUCOSE BLD GHB EST-MCNC: 146 MG/DL (ref 68–126)
GLUCOSE BLD-MCNC: 133 MG/DL (ref 70–99)
GLUCOSE BLDC GLUCOMTR-MCNC: 106 MG/DL (ref 70–99)
GLUCOSE BLDC GLUCOMTR-MCNC: 133 MG/DL (ref 70–99)
GLUCOSE BLDC GLUCOMTR-MCNC: 98 MG/DL (ref 70–99)
HBA1C MFR BLD: 6.7 % (ref ?–5.7)
HCT VFR BLD AUTO: 33.4 %
HGB BLD-MCNC: 11.3 G/DL
IMM GRANULOCYTES # BLD AUTO: 0.15 X10(3) UL (ref 0–1)
IMM GRANULOCYTES NFR BLD: 1.2 %
LYMPHOCYTES # BLD AUTO: 1.88 X10(3) UL (ref 1–4)
LYMPHOCYTES NFR BLD AUTO: 14.8 %
MCH RBC QN AUTO: 30.8 PG (ref 26–34)
MCHC RBC AUTO-ENTMCNC: 33.8 G/DL (ref 31–37)
MCV RBC AUTO: 91 FL
MONOCYTES # BLD AUTO: 0.84 X10(3) UL (ref 0.1–1)
MONOCYTES NFR BLD AUTO: 6.6 %
NEUTROPHILS # BLD AUTO: 9.79 X10 (3) UL (ref 1.5–7.7)
NEUTROPHILS # BLD AUTO: 9.79 X10(3) UL (ref 1.5–7.7)
NEUTROPHILS NFR BLD AUTO: 76.9 %
OSMOLALITY SERPL CALC.SUM OF ELEC: 301 MOSM/KG (ref 275–295)
PLATELET # BLD AUTO: 196 10(3)UL (ref 150–450)
POTASSIUM SERPL-SCNC: 4 MMOL/L (ref 3.5–5.1)
PROT SERPL-MCNC: 6.5 G/DL (ref 5.7–8.2)
RBC # BLD AUTO: 3.67 X10(6)UL
RH BLOOD TYPE: NEGATIVE
SODIUM SERPL-SCNC: 140 MMOL/L (ref 136–145)
WBC # BLD AUTO: 12.7 X10(3) UL (ref 4–11)

## 2024-10-04 PROCEDURE — 99223 1ST HOSP IP/OBS HIGH 75: CPT | Performed by: HOSPITALIST

## 2024-10-04 RX ORDER — ROSUVASTATIN CALCIUM 10 MG/1
10 TABLET, COATED ORAL NIGHTLY
COMMUNITY

## 2024-10-04 RX ORDER — METOCLOPRAMIDE HYDROCHLORIDE 5 MG/ML
10 INJECTION INTRAMUSCULAR; INTRAVENOUS ONCE
Status: COMPLETED | OUTPATIENT
Start: 2024-10-04 | End: 2024-10-04

## 2024-10-04 RX ORDER — MORPHINE SULFATE 4 MG/ML
4 INJECTION, SOLUTION INTRAMUSCULAR; INTRAVENOUS EVERY 2 HOUR PRN
Status: DISCONTINUED | OUTPATIENT
Start: 2024-10-04 | End: 2024-10-05

## 2024-10-04 RX ORDER — DEXTROSE MONOHYDRATE 25 G/50ML
50 INJECTION, SOLUTION INTRAVENOUS
Status: DISCONTINUED | OUTPATIENT
Start: 2024-10-04 | End: 2024-10-05

## 2024-10-04 RX ORDER — NICOTINE POLACRILEX 4 MG
15 LOZENGE BUCCAL
Status: DISCONTINUED | OUTPATIENT
Start: 2024-10-04 | End: 2024-10-05

## 2024-10-04 RX ORDER — ONDANSETRON 2 MG/ML
4 INJECTION INTRAMUSCULAR; INTRAVENOUS EVERY 6 HOURS PRN
Status: DISCONTINUED | OUTPATIENT
Start: 2024-10-04 | End: 2024-10-05

## 2024-10-04 RX ORDER — ROSUVASTATIN CALCIUM 10 MG/1
10 TABLET, COATED ORAL NIGHTLY
Status: DISCONTINUED | OUTPATIENT
Start: 2024-10-04 | End: 2024-10-05

## 2024-10-04 RX ORDER — METOCLOPRAMIDE HYDROCHLORIDE 5 MG/ML
10 INJECTION INTRAMUSCULAR; INTRAVENOUS EVERY 8 HOURS PRN
Status: DISCONTINUED | OUTPATIENT
Start: 2024-10-04 | End: 2024-10-05

## 2024-10-04 RX ORDER — RAMIPRIL 10 MG/1
10 CAPSULE ORAL DAILY
COMMUNITY

## 2024-10-04 RX ORDER — NICOTINE POLACRILEX 4 MG
30 LOZENGE BUCCAL
Status: DISCONTINUED | OUTPATIENT
Start: 2024-10-04 | End: 2024-10-05

## 2024-10-04 RX ORDER — TEMAZEPAM 7.5 MG/1
15 CAPSULE ORAL NIGHTLY PRN
Status: DISCONTINUED | OUTPATIENT
Start: 2024-10-04 | End: 2024-10-05

## 2024-10-04 RX ORDER — TAMSULOSIN HYDROCHLORIDE 0.4 MG/1
0.4 CAPSULE ORAL NIGHTLY
Status: DISCONTINUED | OUTPATIENT
Start: 2024-10-04 | End: 2024-10-05

## 2024-10-04 RX ORDER — MORPHINE SULFATE 2 MG/ML
1 INJECTION, SOLUTION INTRAMUSCULAR; INTRAVENOUS EVERY 2 HOUR PRN
Status: DISCONTINUED | OUTPATIENT
Start: 2024-10-04 | End: 2024-10-05

## 2024-10-04 RX ORDER — ACETAMINOPHEN 500 MG
500 TABLET ORAL EVERY 4 HOURS PRN
Status: DISCONTINUED | OUTPATIENT
Start: 2024-10-04 | End: 2024-10-05

## 2024-10-04 RX ORDER — SODIUM CHLORIDE 9 MG/ML
INJECTION, SOLUTION INTRAVENOUS CONTINUOUS
Status: DISCONTINUED | OUTPATIENT
Start: 2024-10-04 | End: 2024-10-05

## 2024-10-04 RX ORDER — AMLODIPINE BESYLATE 5 MG/1
5 TABLET ORAL DAILY
Status: DISCONTINUED | OUTPATIENT
Start: 2024-10-05 | End: 2024-10-05

## 2024-10-04 RX ORDER — MORPHINE SULFATE 2 MG/ML
2 INJECTION, SOLUTION INTRAMUSCULAR; INTRAVENOUS EVERY 2 HOUR PRN
Status: DISCONTINUED | OUTPATIENT
Start: 2024-10-04 | End: 2024-10-05

## 2024-10-04 RX ORDER — GLIMEPIRIDE 2 MG/1
2 TABLET ORAL
COMMUNITY

## 2024-10-04 RX ORDER — GABAPENTIN 100 MG/1
100 CAPSULE ORAL 3 TIMES DAILY
COMMUNITY
End: 2024-10-04 | Stop reason: SINTOL

## 2024-10-04 RX ORDER — RAMIPRIL 10 MG/1
10 CAPSULE ORAL DAILY
Status: DISCONTINUED | OUTPATIENT
Start: 2024-10-05 | End: 2024-10-05

## 2024-10-04 NOTE — ED INITIAL ASSESSMENT (HPI)
Pt c/o dark tarry diarrhea since yesterday. Pt also c/o uncontrollable blood pressure throughout the night.

## 2024-10-04 NOTE — ED QUICK NOTES
Orders for admission, patient is aware of plan and ready to go upstairs. Any questions, please call ED RN Sid at extension [phone].     Patient Covid vaccination status: Fully vaccinated     COVID Test Ordered in ED: None    COVID Suspicion at Admission: N/A    Running Infusions:  None    Mental Status/LOC at time of transport: a/o x 4    Other pertinent information: Pt is from home. Pt is independent with ambulation.  CIWA score: N/A   NIH score:  N/A

## 2024-10-04 NOTE — PLAN OF CARE
Pt is alert and oriented x4. On room air. Vital signs stable. Ambulating independently. On clear liquid diet. Tolerating well. Pt reports having bright blood in the stool. EGD scheduled for tomorrow at 9:30am. Complains of a headache. Tylenol given. Continent of bladder and bowel. Safety measures in place. Will continue to monitor.     Problem: Patient Centered Care  Goal: Patient preferences are identified and integrated in the patient's plan of care  Description: Interventions:  - Provide timely, complete, and accurate information to patient/family  - Incorporate patient and family knowledge, values, beliefs, and cultural backgrounds into the planning and delivery of care  - Encourage patient/family to participate in care and decision-making at the level they choose  - Honor patient and family perspectives and choices  Outcome: Progressing

## 2024-10-04 NOTE — H&P
[facility]    PATIENT'S NAME: TOMY VERGARA   ATTENDING PHYSICIAN: Marlo Vasquez MD   PATIENT ACCOUNT#:   [id number]    LOCATION:  [address] [place] [address]  MEDICAL RECORD #:   [id number]       YOB: 1950  ADMISSION DATE:       10/04/2024    HISTORY AND PHYSICAL EXAMINATION    CHIEF COMPLAINT:  Melena, gastrointestinal bleed.    HISTORY OF PRESENT ILLNESS:  Patient is a 73-year-old  male, came into the emergency department for evaluation of black, tarry, foul odor bowel movements since last night.  CBC showed hemoglobin of 11.3, dropped from 13.5 baseline; white blood cell count of 12.7 with left shift.  Chemistry showed BUN-to-creatinine ratio of 35.5, normal GFR.  Liver function tests were normal.  Patient was initiated on IV Protonix and Reglan in the emergency room, and he will be admitted to the hospital for further management.     PAST MEDICAL HISTORY:  Diabetes mellitus type 2, non-insulin dependent; hypertension; hyperlipidemia; benign prostatic hypertrophy; chronic migraines; osteoarthritis; diverticulosis.    PAST SURGICAL HISTORY:  Bilateral inguinal hernia repair, umbilical hernia repair, bilateral elbow surgeries and appendectomy.    MEDICATIONS:  Please see medication reconciliation list.     ALLERGIES:  Ciprofloxacin, sulfa, and tetracycline.     FAMILY HISTORY:  Mother had heart disease and diabetes mellitus type 2.    SOCIAL HISTORY:  No tobacco, alcohol, or drug use.  Independent in his basic activities of daily living.     REVIEW OF SYSTEMS:  Patient said he has chronic migraines and he takes aspirin 500 mg twice a day for a long time.  For the last few weeks, he has been having dyspepsia, especially if he eats late at night.  Yesterday started having tarry, dark black bowel movements without abdominal pain.  No nausea or vomiting.  Other 12-point review of systems is negative.       PHYSICAL EXAMINATION:    GENERAL:  Alert and oriented to time, place and person.   Fatigued.  No acute distress.   VITAL SIGNS:  Temperature 97.0; pulse 110, sinus tachycardia; respiratory rate 20; blood pressure 112/80; pulse ox 96% on room air.  HEENT:  Atraumatic.  Oropharynx clear.  Moist mucous membranes.  Ears and nose normal.  Eyes:  Anicteric sclerae.   NECK:  Supple.  No lymphadenopathy.  Trachea midline.  Full range of motion.   LUNGS:  Clear to auscultation bilaterally.  Normal respiratory effort.    HEART:  Regular rate and rhythm.  S1 and S2 auscultated.  No murmur.    ABDOMEN:  Soft, nondistended.  No tenderness.  Positive bowel sounds.   EXTREMITIES:  No peripheral edema, clubbing or cyanosis.   NEUROLOGIC:  Motor and sensory intact.      ASSESSMENT:    1.   Melena, gastrointestinal bleed.  Rule out peptic ulcer disease secondary to large-dose NSAIDs use.  Patient takes aspirin 500 mg p.o. b.i.d. for chronic migraines.  Also rule out malignancy.  Patient never had an EGD in the past.  2.   Mild posthemorrhagic anemia.  3.   Diabetes mellitus type 2.  4.   Obesity.    PLAN:  Patient will be admitted to general medical floor.  N.p.o.  IV fluids.  Clear liquid diet.  Gastroenterology consult.  Monitor Accu-Cheks.  Monitor hemodynamic status.  EGD later on today.  Further recommendations to follow.     Dictated By Marlo Vasquez MD  d: 10/04/2024 11:17:46  t: 10/04/2024 13:08:13  Job [id number]  /

## 2024-10-04 NOTE — ED PROVIDER NOTES
Patient Seen in: [facility] Emergency Department    History     Chief Complaint   Patient presents with    Bleeding     Stated Complaint: GI Bleed; Diarrhea     HPI    73-year-old male with past medical history of diabetes, hypertension, dyslipidemia, chronic neck pain with recent initiation/ongoing aspirin use presenting with son-in-law for evaluation with dark/black stools and diarrhea since yesterday associate with seemingly labile blood pressures trending towards hypotension noting bia systolic of 60 last evening.  No fevers or chills, no vomiting or diarrhea.  Denies NSAID or steroid/alcohol use. EMR reveiewed, 11/2021 hemoglobin of 16.3 g/dL; known to Toni Anguiano; no prior history of UGIB.    Past Medical History:    BPH (benign prostatic hyperplasia)    Essential hypertension    Hyperlipidemia    Migraines    Osteoarthritis    Type II or unspecified type diabetes mellitus without mention of complication, not stated as uncontrolled    controlled     Visual impairment    glasses       Past Surgical History:   Procedure Laterality Date    Appendectomy      Appendectomy      Elbow surgery Bilateral     Hernia surgery      BIHR     Hernia surgery      UHR             Family History   Problem Relation Age of Onset    Diabetes Father     Diabetes Mother        Social History     Socioeconomic History    Marital status:     Number of children: 2   Occupational History    Occupation: retired    Tobacco Use    Smoking status: Never    Smokeless tobacco: Never   Vaping Use    Vaping status: Never Used   Substance and Sexual Activity    Alcohol use: No    Drug use: No   Other Topics Concern    Caffeine Concern Yes     Comment: 1 cup of coffee daily     Exercise No   Social History Narrative    The patient does not use an assistive device..      The patient does live in a home with stairs.       Review of Systems :  Constitutional: As per HPI  Gastrointestinal: Negative for vomiting and abdominal  pain. (+) dark/tarry stool.  Genitourinary: Negative for dysuria and hematuria.     Positive for stated complaint: GI Bleed; Diarrhea  Other systems are as noted in HPI.  Constitutional and vital signs reviewed.      All other systems reviewed and negative except as noted above.    PSFH elements reviewed from today and agreed except as otherwise stated in HPI.    Physical Exam     ED Triage Vitals [10/04/24 0947]   /80   Pulse 110   Resp 22   Temp 97 °F (36.1 °C)   Temp src Temporal   SpO2 98 %   O2 Device        Current:/80   Pulse 110   Temp 97 °F (36.1 °C) (Temporal)   Resp 22   Ht 177.8 cm (5' 10\")   Wt 99.8 kg   SpO2 98%   BMI 31.57 kg/m²         Physical Exam   Constitutional: No distress.   HEENT: MMM.  Head: Normocephalic.   Eyes: No injection.   Cardiovascular: RRR.   Pulmonary/Chest: Effort normal. CTAB.  Abdominal: Soft.  Nontender.  TAMIKO without gross blood, melena with black positivity noted.  Musculoskeletal: No gross deformity.  Neurological: Alert.   Skin: Skin is warm.   Psychiatric: Cooperative.  Nursing note and vitals reviewed.        ED Course     Labs Reviewed   CBC WITH DIFFERENTIAL WITH PLATELET - Abnormal; Notable for the following components:       Result Value    WBC 12.7 (*)     RBC 3.67 (*)     HGB 11.3 (*)     HCT 33.4 (*)     Neutrophil Absolute Prelim 9.79 (*)     Neutrophil Absolute 9.79 (*)     All other components within normal limits   BASIC METABOLIC PANEL (8) - Abnormal; Notable for the following components:    Glucose 133 (*)     BUN 39 (*)     BUN/CREA Ratio 35.5 (*)     Calculated Osmolality 301 (*)     All other components within normal limits   HEPATIC FUNCTION PANEL (7) - Normal   HEMOGLOBIN A1C   TYPE AND SCREEN    Narrative:     The following orders were created for panel order Type and screen.  Procedure                               Abnormality         Status                     ---------                               -----------         ------                      ABORH (Blood Type)[[id number]]                               Final result               Antibody Screen[[id number]]                                  Final result                 Please view results for these tests on the individual orders.   ABORH (BLOOD TYPE)   ANTIBODY SCREEN   RAINBOW DRAW LAVENDER   RAINBOW DRAW LIGHT GREEN   RAINBOW DRAW BLUE   RAINBOW DRAW GOLD   STOOL CULTURE W/SHIGATOXIN    Narrative:     The following orders were created for panel order Stool Culture W/Shigatoxin.  Procedure                               Abnormality         Status                     ---------                               -----------         ------                     Stool Culture[[id number]]                                    In process                 Shigatoxin[[id number]]                                       In process                   Please view results for these tests on the individual orders.   C. DIFFICILE(TOXIGENIC)PCR   STOOL CULTURE(P)   SHIGATOXIN       ED Course as of 10/04/24 1132  ------------------------------------------------------------  Time: 10/04 1058  Comment: Discussed with [facility], given 7 AM intake (coffee/cookie/milk, will discuss with anesthesia determine timing of endoscopic evaluation.  ------------------------------------------------------------  Time: 10/04 1132  Comment: [facility]/anesthesia updating - now plan for morning endoscopic evaluation given oral intake with patient okay for CLD and to be made NPO after midnight.       MDM   DIFFERENTIAL DIAGNOSIS: After history and physical exam differential diagnosis includes but is not limited to upper gastrointestinal hemorrhage, gastritis, PUD.    Pulse ox: 98%:Normal on RA, as independently interpreted by myself    Cardiac Monitor Interpretation:   Pulse Readings from Last 1 Encounters:   10/04/24 110   , sinus,     Medical Decision Making  Evaluation for melena in setting of recent/ongoing aspirin use without overt  hemodynamic instability though with reported transient hypotension at home without anticoagulants use.  No prior upper GI hemorrhagic pathology with clinical concern for similar, type and screen sent with PPI initiated.  Case discussed with hospitalist Dr. Vasquez for admission and gastroenterology Dr. Dumont in consultation with plan for endoscopic evaluation/management; given oral intake this morning, plan for AM endoscopic evaluation with CLD throughout day and patient to be NPO after midnight.    Problems Addressed:  Upper gastrointestinal hemorrhage: acute illness or injury    Amount and/or Complexity of Data Reviewed  External Data Reviewed: labs and radiology.     Details: 11/2021 CBC, 2/2020 Lexiscan reviewed  Labs: ordered. Decision-making details documented in ED Course.  Discussion of management or test interpretation with external provider(s): Case d/w hospitalist Dr. Vasquez for admission, Dr. Dumont in consultation    Risk  Prescription drug management.  Decision regarding hospitalization.        I was wearing at minimum a facemask and eye protection throughout this encounter with handwashing performed prior and after patient evaluation without personal hand/facial/oropharyngeal contact and gloves worn throughout encounter. See note and/or contact this provider for further PPE details.    Disposition and Plan     Clinical Impression:  1. Upper gastrointestinal hemorrhage        Disposition:  Admit    Follow-up:  No follow-up provider specified.    Medications Prescribed:  Current Discharge Medication List

## 2024-10-04 NOTE — CONSULTS
[facility]  part of [facility]    Report of Consultation    Armani Romero Patient Status:  Emergency    10/7/1950 MRN [id number]   Location [facility] EMERGENCY DEPARTMENT Attending Andrew Antoine MD   Hosp Day # 0 PCP MATT LEDESMA MD     Date of Admission:  10/4/2024  Date of Consult:  10/4/2024  Reason for Consultation:   melena    History of Present Illness:   Patient is a 73 year old male with pmhx of HTN, HLP, BPH, migraines, and DM who was admitted to the hospital for black tarry, loose stools since yesterday. Initially pt thought that he had food poisoning. He reported SBP to 70s while at home and lightheadedness.  No syncope.  Denies N/V, dysphagia, epigastric pain.  No prior EGD.  No acid suppression at home. Has been taking ASA 500mg twice daily at home for saturnino and migraines.  Prev followed by Dr. Anguiano for screening colonoscopy.  While in ER, noted to have melena on exam per ER. SBPs improved to > 110-120s with HR to 101.  Last meal earlier today (rice, cookie, coffee).      Past Medical History  Past Medical History:    Anesthesia complication    unable to urinate    BPH (benign prostatic hyperplasia)    Diverticulosis of large intestine    Essential hypertension    High blood pressure    High cholesterol    Hyperlipidemia    Migraines    Osteoarthritis    neck    Type II or unspecified type diabetes mellitus without mention of complication, not stated as uncontrolled    controlled     Visual impairment    glasses       Past Surgical History  Past Surgical History:   Procedure Laterality Date    Appendectomy      Elbow surgery Bilateral     Hernia surgery      BIHR     Hernia surgery      UHR        Family History  Family History   Problem Relation Age of Onset    Diabetes Father     Heart Disorder Mother     Diabetes Mother        Social History  Pediatric History   Patient Parents    Not on file     Other Topics Concern     Service Not Asked    Blood  Transfusions Not Asked    Caffeine Concern Yes     Comment: 1 cup of coffee daily     Occupational Exposure Not Asked    Hobby Hazards Not Asked    Sleep Concern Not Asked    Stress Concern Not Asked    Weight Concern Not Asked    Special Diet Not Asked    Back Care Not Asked    Exercise No    Bike Helmet Not Asked    Seat Belt Not Asked    Self-Exams Not Asked   Social History Narrative    The patient does not use an assistive device..      The patient does live in a home with stairs.           Current Medications:  No current facility-administered medications for this encounter.     (Not in a hospital admission)      Allergies  Allergies   Allergen Reactions    Ciprofloxacin HIVES    Sulfa Antibiotics HIVES    Augmentin [Amoxicillin-Pot Clavulanate] PAIN     Pain in stomach     Gabapentin FATIGUE    Tetracycline HIVES    Vibramycin [Doxycycline] OTHER (SEE COMMENTS)     Caused high blood pressure    Levaquin [Levofloxacin] DIARRHEA     Diarrhea, unless patient takes it with Florastor         Review of Systems:   GENERAL HEALTH: feels well otherwise, denies fever or weight loss  SKIN: denies any unusual skin lesions or rashes  EYES: no visual complaints or deficits  HEENT: denies mouth sores  RESPIRATORY: no shortness of breath   CARDIOVASCULAR:no chest pain   GI: Refer to HPI,   : no hematuria  MUSCULOSKELETAL: no joint swelling or warmth  NEURO: no focal weakness or numbness  PSYCHE: no depression or anxiety  HEMATOLOGIC: no easy bruising  ENDOCRINE: no temperature intolerance    Physical Exam:   Blood pressure 122/80, pulse 101, temperature 97 °F (36.1 °C), temperature source Temporal, resp. rate 19, height 5' 10\" (1.778 m), weight 220 lb (99.8 kg), SpO2 98%.  GENERAL: well developed, in no apparent distress  SKIN: no rashes,no suspicious lesions  HEENT: atraumatic, normocephalic  LUNGS: nonlabored breathing  GI: normal active BS, no tenderness on palpation, no masses or ascites, normal liver span/no  organomegaly  EXTREMITIES: no edema  PSYCH: normal affect  NUT: well nourished appearing, no cachexia      Results:     Laboratory Data:  Lab Results   Component Value Date    WBC 12.7 (H) 10/04/2024    HGB 11.3 (L) 10/04/2024    HCT 33.4 (L) 10/04/2024    .0 10/04/2024    CREATSERUM 1.10 10/04/2024    BUN 39 (H) 10/04/2024     10/04/2024    K 4.0 10/04/2024     10/04/2024    CO2 24.0 10/04/2024     (H) 10/04/2024    CA 9.0 10/04/2024    ALB 4.2 10/04/2024    ALKPHO 52 10/04/2024    TP 6.5 10/04/2024    AST 11 10/04/2024    ALT 11 10/04/2024       Imaging:  No results found.     Impression:   Melena:  suspect upper GI source in setting of ASA 500mg BID for pain. No prior EGD. No current N/V or abd pain. Last solid meal earlier today.    Acute Anemia:  hgb 11s.  However prev baseline 16.     Hypotension: self reported by pt at home.  Normotensive in ER    Recommendations:  EGD with MAC tomorrow AM.   Clear liquid diet now, NPO at MN  IV PPI BID  Trend Hgb, transfuse as needed.     Time spent in direct patient contact and decision making as well as counseling/coordination of care:  70 minutes  Thank you for allowing me to participate in the care of your patient.    Radha Dumont DO  10/4/2024

## 2024-10-04 NOTE — ED QUICK NOTES
Pt states having dark tarry diarrhea since yesterday. Denies n/v. States was sweating but had no fever. States had intermittent low b/p throughout the night.

## 2024-10-05 ENCOUNTER — ANESTHESIA EVENT (OUTPATIENT)
Dept: ENDOSCOPY | Facility: HOSPITAL | Age: 74
End: 2024-10-05
Payer: MEDICARE

## 2024-10-05 ENCOUNTER — ANESTHESIA (OUTPATIENT)
Dept: ENDOSCOPY | Facility: HOSPITAL | Age: 74
End: 2024-10-05
Payer: MEDICARE

## 2024-10-05 VITALS
HEART RATE: 89 BPM | DIASTOLIC BLOOD PRESSURE: 78 MMHG | OXYGEN SATURATION: 98 % | BODY MASS INDEX: 31.48 KG/M2 | RESPIRATION RATE: 20 BRPM | TEMPERATURE: 98 F | WEIGHT: 219.88 LBS | SYSTOLIC BLOOD PRESSURE: 109 MMHG | HEIGHT: 70 IN

## 2024-10-05 LAB
ANION GAP SERPL CALC-SCNC: 5 MMOL/L (ref 0–18)
BASOPHILS # BLD AUTO: 0.07 X10(3) UL (ref 0–0.2)
BASOPHILS NFR BLD AUTO: 0.6 %
BUN BLD-MCNC: 24 MG/DL (ref 9–23)
BUN/CREAT SERPL: 20.9 (ref 10–20)
CALCIUM BLD-MCNC: 9.2 MG/DL (ref 8.7–10.4)
CHLORIDE SERPL-SCNC: 109 MMOL/L (ref 98–112)
CO2 SERPL-SCNC: 27 MMOL/L (ref 21–32)
CREAT BLD-MCNC: 1.15 MG/DL
DEPRECATED RDW RBC AUTO: 46.6 FL (ref 35.1–46.3)
EGFRCR SERPLBLD CKD-EPI 2021: 67 ML/MIN/1.73M2 (ref 60–?)
EOSINOPHIL # BLD AUTO: 0.13 X10(3) UL (ref 0–0.7)
EOSINOPHIL NFR BLD AUTO: 1.1 %
ERYTHROCYTE [DISTWIDTH] IN BLOOD BY AUTOMATED COUNT: 13.6 % (ref 11–15)
GLUCOSE BLD-MCNC: 137 MG/DL (ref 70–99)
GLUCOSE BLDC GLUCOMTR-MCNC: 146 MG/DL (ref 70–99)
GLUCOSE BLDC GLUCOMTR-MCNC: 157 MG/DL (ref 70–99)
HCT VFR BLD AUTO: 32 %
HGB BLD-MCNC: 10.5 G/DL
IMM GRANULOCYTES # BLD AUTO: 0.09 X10(3) UL (ref 0–1)
IMM GRANULOCYTES NFR BLD: 0.8 %
LYMPHOCYTES # BLD AUTO: 2.48 X10(3) UL (ref 1–4)
LYMPHOCYTES NFR BLD AUTO: 21.6 %
MCH RBC QN AUTO: 30.8 PG (ref 26–34)
MCHC RBC AUTO-ENTMCNC: 32.8 G/DL (ref 31–37)
MCV RBC AUTO: 93.8 FL
MONOCYTES # BLD AUTO: 0.78 X10(3) UL (ref 0.1–1)
MONOCYTES NFR BLD AUTO: 6.8 %
NEUTROPHILS # BLD AUTO: 7.95 X10 (3) UL (ref 1.5–7.7)
NEUTROPHILS # BLD AUTO: 7.95 X10(3) UL (ref 1.5–7.7)
NEUTROPHILS NFR BLD AUTO: 69.1 %
OSMOLALITY SERPL CALC.SUM OF ELEC: 298 MOSM/KG (ref 275–295)
PLATELET # BLD AUTO: 174 10(3)UL (ref 150–450)
POTASSIUM SERPL-SCNC: 5 MMOL/L (ref 3.5–5.1)
RBC # BLD AUTO: 3.41 X10(6)UL
SODIUM SERPL-SCNC: 141 MMOL/L (ref 136–145)
WBC # BLD AUTO: 11.5 X10(3) UL (ref 4–11)

## 2024-10-05 PROCEDURE — 0DJ08ZZ INSPECTION OF UPPER INTESTINAL TRACT, VIA NATURAL OR ARTIFICIAL OPENING ENDOSCOPIC: ICD-10-PCS | Performed by: INTERNAL MEDICINE

## 2024-10-05 PROCEDURE — 99239 HOSP IP/OBS DSCHRG MGMT >30: CPT | Performed by: HOSPITALIST

## 2024-10-05 RX ORDER — NALOXONE HYDROCHLORIDE 0.4 MG/ML
0.08 INJECTION, SOLUTION INTRAMUSCULAR; INTRAVENOUS; SUBCUTANEOUS ONCE AS NEEDED
Status: DISCONTINUED | OUTPATIENT
Start: 2024-10-05 | End: 2024-10-05 | Stop reason: HOSPADM

## 2024-10-05 RX ORDER — ONDANSETRON 2 MG/ML
4 INJECTION INTRAMUSCULAR; INTRAVENOUS EVERY 6 HOURS PRN
Status: DISCONTINUED | OUTPATIENT
Start: 2024-10-05 | End: 2024-10-05 | Stop reason: HOSPADM

## 2024-10-05 RX ORDER — MORPHINE SULFATE 4 MG/ML
4 INJECTION, SOLUTION INTRAMUSCULAR; INTRAVENOUS EVERY 10 MIN PRN
Status: DISCONTINUED | OUTPATIENT
Start: 2024-10-05 | End: 2024-10-05 | Stop reason: HOSPADM

## 2024-10-05 RX ORDER — MORPHINE SULFATE 4 MG/ML
2 INJECTION, SOLUTION INTRAMUSCULAR; INTRAVENOUS EVERY 10 MIN PRN
Status: DISCONTINUED | OUTPATIENT
Start: 2024-10-05 | End: 2024-10-05 | Stop reason: HOSPADM

## 2024-10-05 RX ORDER — NALOXONE HYDROCHLORIDE 0.4 MG/ML
80 INJECTION, SOLUTION INTRAMUSCULAR; INTRAVENOUS; SUBCUTANEOUS AS NEEDED
Status: DISCONTINUED | OUTPATIENT
Start: 2024-10-05 | End: 2024-10-05 | Stop reason: HOSPADM

## 2024-10-05 RX ORDER — PANTOPRAZOLE SODIUM 40 MG/1
40 TABLET, DELAYED RELEASE ORAL
Status: DISCONTINUED | OUTPATIENT
Start: 2024-10-05 | End: 2024-10-05

## 2024-10-05 RX ORDER — PANTOPRAZOLE SODIUM 40 MG/1
40 TABLET, DELAYED RELEASE ORAL
Qty: 60 TABLET | Refills: 0 | Status: SHIPPED | OUTPATIENT
Start: 2024-10-05 | End: 2024-11-04

## 2024-10-05 RX ORDER — HYDROMORPHONE HYDROCHLORIDE 1 MG/ML
0.4 INJECTION, SOLUTION INTRAMUSCULAR; INTRAVENOUS; SUBCUTANEOUS EVERY 5 MIN PRN
Status: DISCONTINUED | OUTPATIENT
Start: 2024-10-05 | End: 2024-10-05 | Stop reason: HOSPADM

## 2024-10-05 RX ORDER — HYDROMORPHONE HYDROCHLORIDE 1 MG/ML
0.2 INJECTION, SOLUTION INTRAMUSCULAR; INTRAVENOUS; SUBCUTANEOUS EVERY 5 MIN PRN
Status: DISCONTINUED | OUTPATIENT
Start: 2024-10-05 | End: 2024-10-05 | Stop reason: HOSPADM

## 2024-10-05 RX ORDER — MORPHINE SULFATE 10 MG/ML
6 INJECTION, SOLUTION INTRAMUSCULAR; INTRAVENOUS EVERY 10 MIN PRN
Status: DISCONTINUED | OUTPATIENT
Start: 2024-10-05 | End: 2024-10-05 | Stop reason: HOSPADM

## 2024-10-05 RX ORDER — METOCLOPRAMIDE HYDROCHLORIDE 5 MG/ML
5 INJECTION INTRAMUSCULAR; INTRAVENOUS EVERY 8 HOURS PRN
Status: DISCONTINUED | OUTPATIENT
Start: 2024-10-05 | End: 2024-10-05 | Stop reason: HOSPADM

## 2024-10-05 RX ORDER — SODIUM CHLORIDE, SODIUM LACTATE, POTASSIUM CHLORIDE, CALCIUM CHLORIDE 600; 310; 30; 20 MG/100ML; MG/100ML; MG/100ML; MG/100ML
INJECTION, SOLUTION INTRAVENOUS CONTINUOUS
Status: DISCONTINUED | OUTPATIENT
Start: 2024-10-05 | End: 2024-10-05

## 2024-10-05 RX ORDER — HYDROMORPHONE HYDROCHLORIDE 1 MG/ML
0.6 INJECTION, SOLUTION INTRAMUSCULAR; INTRAVENOUS; SUBCUTANEOUS EVERY 5 MIN PRN
Status: DISCONTINUED | OUTPATIENT
Start: 2024-10-05 | End: 2024-10-05 | Stop reason: HOSPADM

## 2024-10-05 NOTE — PLAN OF CARE
Problem: Patient Centered Care  Goal: Patient preferences are identified and integrated in the patient's plan of care  Description: Interventions:  - What would you like us to know as we care for you? From home with wife  - Provide timely, complete, and accurate information to patient/family  - Incorporate patient and family knowledge, values, beliefs, and cultural backgrounds into the planning and delivery of care  - Encourage patient/family to participate in care and decision-making at the level they choose  - Honor patient and family perspectives and choices  Outcome: Progressing     Problem: Patient/Family Goals  Goal: Patient/Family Long Term Goal  Description: Patient's Long Term Goal: Discharge from hospital    Interventions:  - Gi consult  -Monitor vital signs  - Monitor appropriate labs  - Monitor blood glucose levels  - Administer medications per order  - Pain management as needed  - Follow MD orders  - Diagnostics per orders  - Update / inform patient and family on plan of care  - Discharge planning      - See additional Care Plan goals for specific interventions  Outcome: Progressing  Goal: Patient/Family Short Term Goal  Description: Patient's Short Term Goal: Feel better    Interventions:   - Monitor vital signs  - Administer medications per order  - Pain management as needed  - Follow MD orders  - Diagnostics per orders  - Update / inform patient and family on plan of care  - Discharge planning    - See additional Care Plan goals for specific interventions  Outcome: Progressing     Problem: GASTROINTESTINAL - ADULT  Goal: Maintains or returns to baseline bowel function  Description: INTERVENTIONS:  - Assess bowel function  - Maintain adequate hydration with IV or PO as ordered and tolerated  - Evaluate effectiveness of GI medications  - Encourage mobilization and activity  - Obtain nutritional consult as needed  - Establish a toileting routine/schedule  - Consider collaborating with pharmacy to  review patient's medication profile  Outcome: Progressing

## 2024-10-05 NOTE — ANESTHESIA POSTPROCEDURE EVALUATION
Patient: Armani Romero    Procedure Summary       Date: 10/05/24 Room / Location: [facility] ENDOSCOPY 01 / [facility] ENDOSCOPY    Anesthesia Start: 0937 Anesthesia Stop:     Procedure: ESOPHAGOGASTRODUODENOSCOPY (EGD) Diagnosis: (gastritis, gastric ulcer, hiatal hernia)    Surgeons: Radha Dumont MD Anesthesiologist: Laverne Chang MD    Anesthesia Type: MAC ASA Status: 2            Anesthesia Type: MAC    Vitals Value Taken Time   BP 94/63 10/05/24 0955   Temp 98 10/05/24 0957   Pulse 94 10/05/24 0956   Resp 20 10/05/24 0956   SpO2 97 % 10/05/24 0956   Vitals shown include unfiled device data.    [facility] AN Post Evaluation:   Patient Evaluated in floor  Patient Participation: complete - patient participated  Level of Consciousness: awake  Pain Management: adequate  Airway Patency:patent  Dental exam unchanged from preop  Yes    Cardiovascular Status: acceptable  Respiratory Status: acceptable  Postoperative Hydration acceptable      LAVERNE CHANG MD  10/5/2024 9:57 AM

## 2024-10-05 NOTE — H&P
HISTORY AND PHYSICAL FOR ENDOSCOPIC PROCEDURE      Armani Romero Patient Status:  Inpatient    10/7/1950 MRN [id number]   Location [facility] ENDOSCOPY LAB SUITES Attending Willy Leal MD   Hosp Day # 1 PCP MATT LEDESMA MD     Date of Consult: 10/5/2024    Reason for Consultation:  Melena, anemia    History of Present Illness:  Armani Romero is a a(n) 73 year old male. Last EGD: none. + (500mg BID of ASA) blood thinners.  + melena for 2 days.  No epigastric pain, N/V.  Hgb > 10. On IV PPI.  NPO since MN      History:  Past Medical History:    Anesthesia complication    unable to urinate    BPH (benign prostatic hyperplasia)    Diverticulosis of large intestine    Essential hypertension    High blood pressure    High cholesterol    Hyperlipidemia    Migraines    Osteoarthritis    neck    Type II or unspecified type diabetes mellitus without mention of complication, not stated as uncontrolled    controlled     Visual impairment    glasses     Past Surgical History:   Procedure Laterality Date    Appendectomy      Elbow surgery Bilateral     Hernia surgery      BIHR     Hernia surgery      UHR      Family History   Problem Relation Age of Onset    Diabetes Father     Heart Disorder Mother     Diabetes Mother       reports that he has never smoked. He has never used smokeless tobacco. He reports that he does not drink alcohol and does not use drugs.    Allergies:  Allergies   Allergen Reactions    Ciprofloxacin HIVES    Sulfa Antibiotics HIVES    Augmentin [Amoxicillin-Pot Clavulanate] PAIN     Pain in stomach     Gabapentin FATIGUE    Tetracycline HIVES    Vibramycin [Doxycycline] OTHER (SEE COMMENTS)     Caused high blood pressure    Levaquin [Levofloxacin] DIARRHEA     Diarrhea, unless patient takes it with Florastor         Medications:    Current Facility-Administered Medications:     sodium chloride 0.9% infusion, , Intravenous, Continuous    acetaminophen (Tylenol Extra Strength) tab  500 mg, 500 mg, Oral, Q4H PRN    ondansetron (Zofran) 4 MG/2ML injection 4 mg, 4 mg, Intravenous, Q6H PRN    metoclopramide (Reglan) 5 mg/mL injection 10 mg, 10 mg, Intravenous, Q8H PRN    temazepam (Restoril) cap 15 mg, 15 mg, Oral, Nightly PRN    glucose (Dex4) 15 GM/59ML oral liquid 15 g, 15 g, Oral, Q15 Min PRN **OR** glucose (Glutose) 40% oral gel 15 g, 15 g, Oral, Q15 Min PRN **OR** glucose-vitamin C (Dex-4) chewable tab 4 tablet, 4 tablet, Oral, Q15 Min PRN **OR** dextrose 50% injection 50 mL, 50 mL, Intravenous, Q15 Min PRN **OR** glucose (Dex4) 15 GM/59ML oral liquid 30 g, 30 g, Oral, Q15 Min PRN **OR** glucose (Glutose) 40% oral gel 30 g, 30 g, Oral, Q15 Min PRN **OR** glucose-vitamin C (Dex-4) chewable tab 8 tablet, 8 tablet, Oral, Q15 Min PRN    insulin aspart (NovoLOG) 100 Units/mL FlexPen 1-5 Units, 1-5 Units, Subcutaneous, TID CC    tamsulosin (Flomax) cap 0.4 mg, 0.4 mg, Oral, Nightly    amLODIPine (Norvasc) tab 5 mg, 5 mg, Oral, Daily    ramipril (Altace) cap 10 mg, 10 mg, Oral, Daily    rosuvastatin (Crestor) tab 10 mg, 10 mg, Oral, Nightly    pantoprazole (Protonix) 40 mg in sodium chloride 0.9% PF 10 mL IV push, 40 mg, Intravenous, Q12H    morphINE PF 2 MG/ML injection 1 mg, 1 mg, Intravenous, Q2H PRN **OR** morphINE PF 2 MG/ML injection 2 mg, 2 mg, Intravenous, Q2H PRN **OR** morphINE PF 4 MG/ML injection 4 mg, 4 mg, Intravenous, Q2H PRN    Review of Systems:  Gastrointestinal: negative other than specified in the HPI  General: negative other than specified in the HPI  Neurological: negative other than specified in the HPI  Cardiovascular: negative other than specified in the HPI  Respiratory: negative other than specified in the HPI  Skin: negative other than specified in the HPI  Allergy: negative other than specified in the HPI  ENT: negative other than specified in the HPI  Physical Exam:    Blood pressure 110/73, pulse 93, temperature 98.2 °F (36.8 °C), temperature source Oral, resp. rate  22, height 5' 10\" (1.778 m), weight 219 lb 14.4 oz (99.7 kg), SpO2 96%.    General: Appears alert, oriented x3 and in no acute distress.  HEENT: Normal. No neck vein distention. Thyroid not enlarged.  No lymphadenopathy.  CV: S1 and S2 normal.  No murmurs or gallops.  Lungs: Clear to auscultation.  Abdomen: Soft and nondistended.  Nontender.  No masses.  Bowel sounds are present.  Back: No CVA tenderness.    Imaging:  none    Assessment/Plan:  Patient Active Problem List   Diagnosis    Hypertension    Diabetes (HCC)    Chronic headaches    Non-recurrent bilateral inguinal hernia without obstruction or gangrene    Ventral hernia with obstruction and without gangrene    Upper gastrointestinal hemorrhage    Melena         Radha Dumont DO  10/5/2024  9:31 AM

## 2024-10-05 NOTE — PLAN OF CARE
Problem: Patient Centered Care  Goal: Patient preferences are identified and integrated in the patient's plan of care  Description: Interventions:  - What would you like us to know as we care for you? From home with wife  - Provide timely, complete, and accurate information to patient/family  - Incorporate patient and family knowledge, values, beliefs, and cultural backgrounds into the planning and delivery of care  - Encourage patient/family to participate in care and decision-making at the level they choose  - Honor patient and family perspectives and choices  Outcome: Progressing     Problem: Patient/Family Goals  Goal: Patient/Family Long Term Goal  Description: Patient's Long Term Goal: Discharge from hospital    Interventions:  - Gi consult  -Monitor vital signs  - Monitor appropriate labs  - Monitor blood glucose levels  - Administer medications per order  - Pain management as needed  - Follow MD orders  - Diagnostics per orders  - Update / inform patient and family on plan of care  - Discharge planning      - See additional Care Plan goals for specific interventions  Outcome: Progressing  Goal: Patient/Family Short Term Goal  Description: Patient's Short Term Goal: Feel better    Interventions:   - Monitor vital signs  - Administer medications per order  - Pain management as needed  - Follow MD orders  - Diagnostics per orders  - Update / inform patient and family on plan of care  - Discharge planning    - See additional Care Plan goals for specific interventions  Outcome: Progressing     Problem: GASTROINTESTINAL - ADULT  Goal: Maintains or returns to baseline bowel function  Description: INTERVENTIONS:  - Assess bowel function  - Maintain adequate hydration with IV or PO as ordered and tolerated  - Evaluate effectiveness of GI medications  - Encourage mobilization and activity  - Obtain nutritional consult as needed  - Establish a toileting routine/schedule  - Consider collaborating with pharmacy to  review patient's medication profile  Outcome: Progressing     Problem: METABOLIC/FLUID AND ELECTROLYTES - ADULT  Goal: Glucose maintained within prescribed range  Description: INTERVENTIONS:  - Monitor Blood Glucose as ordered  - Assess for signs and symptoms of hyperglycemia and hypoglycemia  - Administer ordered medications to maintain glucose within target range  - Assess barriers to adequate nutritional intake and initiate nutrition consult as needed  - Instruct patient on self management of diabetes  Outcome: Progressing     EGD today. Consent in chart. NPO at midnight. IV fluids per order. Monitoring vital signs- stable at this time. No acute changes noted at this time. Safety and fall precautions maintained-  bed locked in lowest position, call light within reach. Wife at bedside

## 2024-10-05 NOTE — DISCHARGE SUMMARY
[facility]  part of [facility]     DISCHARGE SUMMARY     Armani Romero Patient Status:  Observation    10/7/1950 MRN [id number]   Location [facility] 5SW/SE Attending Willy Leal MD   Hosp Day # 1 PCP MATT LEDESMA MD     DATE OF ADMISSION: 10/4/2024  DATE OF DISCHARGE:  10/05/24  DISPOSITION: home  CONDITION ON DISCHARGE: good    DISCHARGE DIAGNOSES:  Upper GI hemorrhage  Gastric ulcer  Acute blood loss anemia  Type 2 diabetes  Hypertension  Dyslipidemia  BPH  Obesity BMI 31    Post-Op Diagnosis:                ESOPHAGUS:  normal. Z line regular 40cm.               STOMACH:  distal antrum/prepyloric erythema with prepyloric < 10mm clean based (shreya class III) ulcer.  No heme seen.                DUODENUM:   Normal to the second portion.     HISTORY OF PRESENT ILLNESS (COPIED FROM ADMISSION H&P)  Patient is a 73-year-old  male, came into the emergency department for evaluation of black, tarry, foul odor bowel movements since last night. CBC showed hemoglobin of 11.3, dropped from 13.5 baseline; white blood cell count of 12.7 with left shift. Chemistry showed BUN-to-creatinine ratio of 35.5, normal GFR. Liver function tests were normal. Patient was initiated on IV Protonix and Reglan in the emergency room, and he will be admitted to the hospital for further management.     HOSPITAL COURSE:  Patient was admitted, did not require transfusion.  He was hemodynamically stable.  Seen by GI and taken for EGD with the above results.  This is almost certainly NSAID induced.  I have asked him to stop taking aspirin as there is no indication for this in his medical history.  He will take Protonix twice daily, follow-up with his primary gastroenterologist Dr. Anguiano.  He is overdue for colonoscopy as well.    Patient understands return to the emergency room for increased pain, fever, discharge, shortness of breath, chest pain, new neurologic symptoms, other concerning  symptoms.    PHYSICAL EXAM:  Temp:  [97.7 °F (36.5 °C)-98.2 °F (36.8 °C)] 97.7 °F (36.5 °C)  Pulse:  [89-98] 89  Resp:  [16-25] 20  BP: ()/(46-80) 109/78  SpO2:  [95 %-99 %] 98 %  Gen: A+Ox3.  No distress.   HEENT: NCAT, neck supple, no carotid bruit.  CV: RRR, S1S2, and intact distal pulses. No gallop, rub, murmur.  Pulm: Effort and breath sounds normal. No distress, wheezes, rales, rhonchi.  Abd: Soft, NTND, BS normal, no mass, no HSM, no rebound/guarding.   Neuro: Normal reflexes, CN. Sensory/motor exams grossly normal deficit. Coordination  and gait normal.   MS: No joint effusions.  No peripheral edema.  Skin: Skin is warm and dry. No rashes, erythema, diaphoresis.   Psych: Normal mood and affect. Behavior and judgment normal.     DISCHARGE MEDICATIONS     Discharge Medications        START taking these medications        Instructions Prescription details   pantoprazole 40 MG Tbec  Commonly known as: Protonix      Take 1 tablet (40 mg total) by mouth 2 (two) times daily before meals.   Stop taking on: November 4, 2024  Quantity: 60 tablet  Refills: 0            CONTINUE taking these medications        Instructions Prescription details   alfuzosin ER 10 MG Tb24  Commonly known as: Uroxatral ER      1 tablet (10 mg total) nightly.   Refills: 1     amLODIPine 5 MG Tabs  Commonly known as: Norvasc      1 tablet (5 mg total) daily.   Refills: 1     cholecalciferol 125 MCG (5000 UT) Caps  Commonly known as: Vitamin D3      Take 1 capsule (5,000 Units total) by mouth Noon.   Refills: 0     glimepiride 2 MG Tabs  Commonly known as: Amaryl      Take 1 tablet (2 mg total) by mouth daily with breakfast.   Refills: 0     MULTIVITAMIN ADULT OR      Take 1 tablet by mouth daily.   Refills: 0     ramipril 10 MG Caps  Commonly known as: Altace      Take 1 capsule (10 mg total) by mouth daily.   Refills: 0     rosuvastatin 10 MG Tabs  Commonly known as: Crestor      Take 1 tablet (10 mg total) by mouth nightly.   Refills:  0     SITagliptin Phosphate 100 MG Tabs  Commonly known as: JANUVIA      Take 1 tablet (100 mg total) by mouth daily.   Refills: 0            STOP taking these medications      aspirin 500 MG Tabs                  Where to Get Your Medications        These medications were sent to [id number] - [place], [place] - [address] AT [facility] [address]  [address] [facility], [phone], [phone]  [address], [facility] [address]      Phone: [phone]   pantoprazole 40 MG [facility]         CONSULTANTS  Consultants         Provider   Role Specialty     Radha Dumont MD      Consulting Physician GASTROENTEROLOGY            FOLLOW UP:  Dani Darling MD  [address]  [address]  [place] [address] [phone]    Follow up in 1 week(s)  Post Discharge Followup    Sonu Anguiano MD  [address]   [address]  [facility] [address] [phone]    Follow up in 2 week(s)  Post Discharge Followup    The above plan and follow-up instructions were reviewed with the patient and they verbalized understanding and agreement.  They understand to return to the emergency room for any concerning signs or symptoms.  Greater than 30 minutes spent on discharge.  -----------------------    Hospital Discharge Diagnoses: Gastric ulcer    Lace+ Score: 45  59-90 High Risk  29-58 Medium Risk  0-28   Low Risk.    TCM Follow-Up Recommendation:  LACE 29-58: Moderate Risk of readmission after discharge from the hospital.

## 2024-10-05 NOTE — ANESTHESIA PREPROCEDURE EVALUATION
Anesthesia PreOp Note    HPI:     Armani Romero is a 73 year old male who presents for preoperative consultation requested by: Radha Dumont MD    Date of Surgery: 10/4/2024 - 10/5/2024    Procedure(s):  ESOPHAGOGASTRODUODENOSCOPY (EGD)  Indication: melena    Relevant Problems   No relevant active problems       NPO:  Last Liquid Consumption Date: 10/04/24  Last Liquid Consumption Time: 2350  Last Solid Consumption Date: 10/04/24  Last Solid Consumption Time: 0000  Last Liquid Consumption Date: 10/04/24          History Review:  Patient Active Problem List    Diagnosis Date Noted    Upper gastrointestinal hemorrhage 10/04/2024    Melena 10/04/2024    Non-recurrent bilateral inguinal hernia without obstruction or gangrene 01/24/2020    Ventral hernia with obstruction and without gangrene 01/24/2020    Chronic headaches 05/23/2016    Hypertension 07/24/2015    Diabetes (HCC) 07/24/2015       Past Medical History:    Anesthesia complication    unable to urinate    BPH (benign prostatic hyperplasia)    Diverticulosis of large intestine    Essential hypertension    High blood pressure    High cholesterol    Hyperlipidemia    Migraines    Osteoarthritis    neck    Type II or unspecified type diabetes mellitus without mention of complication, not stated as uncontrolled    controlled     Visual impairment    glasses       Past Surgical History:   Procedure Laterality Date    Appendectomy      Elbow surgery Bilateral     Hernia surgery      BIHR     Hernia surgery      UHR        Medications Prior to Admission   Medication Sig Dispense Refill Last Dose    SITagliptin Phosphate 100 MG Oral Tab Take 1 tablet (100 mg total) by mouth daily.   10/4/2024    ramipril 10 MG Oral Cap Take 1 capsule (10 mg total) by mouth daily.   10/3/2024    glimepiride 2 MG Oral Tab Take 1 tablet (2 mg total) by mouth daily with breakfast.   10/3/2024    aspirin 500 MG Oral Tab Take 1 tablet (500 mg total) by mouth 2 (two) times daily as  needed for Pain.   10/3/2024    cholecalciferol (VITAMIN D3) 125 MCG (5000 UT) Oral Cap Take 1 capsule (5,000 Units total) by mouth Noon.   10/3/2024    Multiple Vitamins-Minerals (MULTIVITAMIN ADULT OR) Take 1 tablet by mouth daily.   10/3/2024    AmLODIPine Besylate 5 MG Oral Tab 1 tablet (5 mg total) daily.  1 10/4/2024    Alfuzosin HCl ER 10 MG Oral Tablet 24 Hr 1 tablet (10 mg total) nightly.  1 10/3/2024    rosuvastatin 10 MG Oral Tab Take 1 tablet (10 mg total) by mouth nightly.        Current Facility-Administered Medications Ordered in Epic   Medication Dose Route Frequency Provider Last Rate Last Admin    sodium chloride 0.9% infusion   Intravenous Continuous Marlo Vasquez  mL/hr at 10/04/24 1244 New Bag at 10/04/24 1244    acetaminophen (Tylenol Extra Strength) tab 500 mg  500 mg Oral Q4H PRN Marlo Vasquez MD   500 mg at 10/04/24 2142    ondansetron (Zofran) 4 MG/2ML injection 4 mg  4 mg Intravenous Q6H PRN Marlo Vasquez MD        metoclopramide (Reglan) 5 mg/mL injection 10 mg  10 mg Intravenous Q8H PRN Marlo Vasquez MD        temazepam (Restoril) cap 15 mg  15 mg Oral Nightly PRN Marlo Vasquez MD        glucose (Dex4) 15 GM/59ML oral liquid 15 g  15 g Oral Q15 Min PRN Marlo Vasquez MD        Or    glucose (Glutose) 40% oral gel 15 g  15 g Oral Q15 Min PRN Marlo Vasquez MD        Or    glucose-vitamin C (Dex-4) chewable tab 4 tablet  4 tablet Oral Q15 Min PRN Marlo Vasquez MD        Or    dextrose 50% injection 50 mL  50 mL Intravenous Q15 Min PRN Marlo Vasquez MD        Or    glucose (Dex4) 15 GM/59ML oral liquid 30 g  30 g Oral Q15 Min PRN Marlo Vasquez MD        Or    glucose (Glutose) 40% oral gel 30 g  30 g Oral Q15 Min PRN Marlo Vasquez MD        Or    glucose-vitamin C (Dex-4) chewable tab 8 tablet  8 tablet Oral Q15 Min PRN Marlo Vasquez MD        insulin aspart (NovoLOG) 100 Units/mL FlexPen 1-5 Units  1-5 Units Subcutaneous TID CC Marlo Vasquez MD         tamsulosin (Flomax) cap 0.4 mg  0.4 mg Oral Nightly Marlo Vasquez MD   0.4 mg at 10/04/24 2143    amLODIPine (Norvasc) tab 5 mg  5 mg Oral Daily Marlo Vasquez MD        ramipril (Altace) cap 10 mg  10 mg Oral Daily Marlo Vasquez MD        rosuvastatin (Crestor) tab 10 mg  10 mg Oral Nightly Marlo Vasquez MD   10 mg at 10/04/24 2143    pantoprazole (Protonix) 40 mg in sodium chloride 0.9% PF 10 mL IV push  40 mg Intravenous Q12H Radha Dumont MD   40 mg at 10/04/24 2145    morphINE PF 2 MG/ML injection 1 mg  1 mg Intravenous Q2H PRN Marlo Vasquez MD        Or    morphINE PF 2 MG/ML injection 2 mg  2 mg Intravenous Q2H PRN Marlo Vasquez MD        Or    morphINE PF 4 MG/ML injection 4 mg  4 mg Intravenous Q2H PRN Marlo Vasquez MD         No current [facility]-ordered outpatient medications on file.       Allergies   Allergen Reactions    Ciprofloxacin HIVES    Sulfa Antibiotics HIVES    Augmentin [Amoxicillin-Pot Clavulanate] PAIN     Pain in stomach     Gabapentin FATIGUE    Tetracycline HIVES    Vibramycin [Doxycycline] OTHER (SEE COMMENTS)     Caused high blood pressure    Levaquin [Levofloxacin] DIARRHEA     Diarrhea, unless patient takes it with Florastor         Family History   Problem Relation Age of Onset    Diabetes Father     Heart Disorder Mother     Diabetes Mother      Social History     Socioeconomic History    Marital status:     Number of children: 2   Occupational History    Occupation: retired    Tobacco Use    Smoking status: Never    Smokeless tobacco: Never   Vaping Use    Vaping status: Never Used   Substance and Sexual Activity    Alcohol use: No     Comment: non alcoholic beer    Drug use: No   Other Topics Concern    Caffeine Concern Yes     Comment: 1 cup of coffee daily     Exercise No       Available pre-op labs reviewed.  Lab Results   Component Value Date    WBC 11.5 (H) 10/05/2024    RBC 3.41 (L) 10/05/2024    HGB 10.5 (L) 10/05/2024    HCT 32.0 (L) 10/05/2024     MCV 93.8 10/05/2024    MCH 30.8 10/05/2024    MCHC 32.8 10/05/2024    RDW 13.6 10/05/2024    .0 10/05/2024     Lab Results   Component Value Date     10/05/2024    K 5.0 10/05/2024     10/05/2024    CO2 27.0 10/05/2024    BUN 24 (H) 10/05/2024    CREATSERUM 1.15 10/05/2024     (H) 10/05/2024    PGLU 146 (H) 10/05/2024    CA 9.2 10/05/2024          Vital Signs:  Body mass index is 31.55 kg/m².   height is 1.778 m (5' 10\") and weight is 99.7 kg (219 lb 14.4 oz). His oral temperature is 98.2 °F (36.8 °C). His blood pressure is 110/73 and his pulse is 93. His respiration is 22 and oxygen saturation is 96%.   Vitals:    10/04/24 2139 10/05/24 0450 10/05/24 0806 10/05/24 0921   BP: 104/77 112/80 105/73 110/73   Pulse: 90 97  93   Resp: 16 18 20 22   Temp: 98.1 °F (36.7 °C) 97.9 °F (36.6 °C) 98.2 °F (36.8 °C)    TempSrc: Oral Oral Oral    SpO2: 95% 97% 95% 96%   Weight:       Height:            Anesthesia Evaluation     Patient summary reviewed and Nursing notes reviewed    Airway   Mallampati: I  Dental      Pulmonary - negative ROS   Cardiovascular   Exercise tolerance: good    NYHA Classification: I    Neuro/Psych - negative ROS     GI/Hepatic/Renal - negative ROS     Endo/Other - negative ROS   Abdominal                  Anesthesia Plan:   ASA:  2  Plan:   MAC  Post-op Pain Management: IV analgesics      I have informed Armani Romero and/or legal guardian or family member of the nature of the anesthetic plan, benefits, risks including possible dental damage if relevant, major complications, and any alternative forms of anesthetic management.   All of the patient's questions were answered to the best of my ability. The patient desires the anesthetic management as planned.  LAVERNE CHANG MD  10/5/2024 9:30 AM  Present on Admission:  **None**

## 2024-10-05 NOTE — OPERATIVE REPORT
EGD Operative Report    Armani Romero Patient Status:  Inpatient    10/7/1950 MRN [id number]   Location [facility] ENDOSCOPY LAB SUITES Attending Willy Leal MD   Hosp Day #   1 PCP MATT LEDESMA MD       Pre-op Diagnosis: melena, anemia    Post-Op Diagnosis:    ESOPHAGUS:  normal. Z line regular 40cm.   STOMACH:  distal antrum/prepyloric erythema with prepyloric < 10mm clean based (shreya class III) ulcer.  No heme seen.    DUODENUM:   Normal to the second portion.     Procedure Performed: EGD     Informed Consent: Informed consent for both the procedure and sedation were obtained from the patient. The potentially life-threatening complications of sedation, bleeding,  Perforation, transfusion or repeat endoscopy were reviewed along with the possible need for hospitalization, surgical management, transfusion or repeat endoscopy should one of these complications arise. The patient understands and is agreeable to proceed.  Sedation Type: MAC-Patient received sedation with monitored anesthesia provided by an anesthesiologist  Moderate Sedation Time: None.  Deep sedation provided by anesthesia.  Procedure Description: The patient was placed in the left lateral decubitus position.  A bite block was placed in the patient’s mouth.  The endoscope was inserted through the mouth and advanced under direct visualization to the descending duodenum and was then withdrawn to examine the duodenal bulb and gastric antrum.  The endoscope was then retroflexed to examine the angulus, GE junction, cardia, body and fundus and then withdrawn to examine the esophagus. The endoscope was then removed from the patient. The patient tolerated the procedure well with no immediate complications and was transferred to the recovery area in stable condition.  Findings:    ESOPHAGUS:  normal. Z line regular 40cm.   STOMACH:  distal antrum/prepyloric erythema with prepyloric < 10mm clean based (shreya class III) ulcer.   No heme seen.    DUODENUM:   Normal to the second portion.   Recommendations: BID PPI PO. No ASA unless for cardiac purposes.  No NSAIDs.  Will test for H pylori as outpatient if H pylori stool Ag cannot be collected prior to discharge today.  Diet as tolerated.  Okay for discharge from Gi standpoint.  Can f/u in Gi clinic in 2-3 weeks.  Discharge:  The patient was given an after visit summary detailing the procedure, findings, recommendations and follow up plans.  Radha Dumont MD  10/5/2024  9:50 AM

## 2024-10-08 LAB — H PYLORI AG STL QL IA: POSITIVE

## 2024-11-25 ENCOUNTER — HOSPITAL ENCOUNTER (OUTPATIENT)
Facility: HOSPITAL | Age: 74
Setting detail: HOSPITAL OUTPATIENT SURGERY
Discharge: HOME | End: 2024-11-25
Attending: INTERNAL MEDICINE | Admitting: INTERNAL MEDICINE
Payer: MEDICARE

## 2024-11-25 ENCOUNTER — ANESTHESIA EVENT (OUTPATIENT)
Dept: ENDOSCOPY | Facility: HOSPITAL | Age: 74
End: 2024-11-25
Payer: MEDICARE

## 2024-11-25 ENCOUNTER — ANESTHESIA (OUTPATIENT)
Dept: ENDOSCOPY | Facility: HOSPITAL | Age: 74
End: 2024-11-25
Payer: MEDICARE

## 2024-11-25 VITALS
HEART RATE: 85 BPM | WEIGHT: 220 LBS | BODY MASS INDEX: 31.5 KG/M2 | DIASTOLIC BLOOD PRESSURE: 85 MMHG | HEIGHT: 70 IN | OXYGEN SATURATION: 93 % | SYSTOLIC BLOOD PRESSURE: 116 MMHG | RESPIRATION RATE: 24 BRPM

## 2024-11-25 DIAGNOSIS — K25.9 GASTRIC ULCER WITHOUT HEMORRHAGE OR PERFORATION, UNSPECIFIED CHRONICITY: ICD-10-CM

## 2024-11-25 DIAGNOSIS — Z12.11 COLON CANCER SCREENING: ICD-10-CM

## 2024-11-25 LAB — GLUCOSE BLDC GLUCOMTR-MCNC: 106 MG/DL (ref 70–99)

## 2024-11-25 PROCEDURE — 0DJD8ZZ INSPECTION OF LOWER INTESTINAL TRACT, VIA NATURAL OR ARTIFICIAL OPENING ENDOSCOPIC: ICD-10-PCS | Performed by: INTERNAL MEDICINE

## 2024-11-25 PROCEDURE — 82962 GLUCOSE BLOOD TEST: CPT

## 2024-11-25 PROCEDURE — 0DB78ZX EXCISION OF STOMACH, PYLORUS, VIA NATURAL OR ARTIFICIAL OPENING ENDOSCOPIC, DIAGNOSTIC: ICD-10-PCS | Performed by: INTERNAL MEDICINE

## 2024-11-25 PROCEDURE — 88312 SPECIAL STAINS GROUP 1: CPT | Performed by: INTERNAL MEDICINE

## 2024-11-25 PROCEDURE — 88305 TISSUE EXAM BY PATHOLOGIST: CPT | Performed by: INTERNAL MEDICINE

## 2024-11-25 RX ORDER — SODIUM CHLORIDE, SODIUM LACTATE, POTASSIUM CHLORIDE, CALCIUM CHLORIDE 600; 310; 30; 20 MG/100ML; MG/100ML; MG/100ML; MG/100ML
INJECTION, SOLUTION INTRAVENOUS CONTINUOUS
Status: DISCONTINUED | OUTPATIENT
Start: 2024-11-25 | End: 2024-11-25

## 2024-11-25 RX ORDER — LIDOCAINE HYDROCHLORIDE 10 MG/ML
INJECTION, SOLUTION EPIDURAL; INFILTRATION; INTRACAUDAL; PERINEURAL AS NEEDED
Status: DISCONTINUED | OUTPATIENT
Start: 2024-11-25 | End: 2024-11-25 | Stop reason: SURG

## 2024-11-25 RX ORDER — NALOXONE HYDROCHLORIDE 0.4 MG/ML
0.08 INJECTION, SOLUTION INTRAMUSCULAR; INTRAVENOUS; SUBCUTANEOUS ONCE AS NEEDED
Status: DISCONTINUED | OUTPATIENT
Start: 2024-11-25 | End: 2024-11-25

## 2024-11-25 RX ADMIN — LIDOCAINE HYDROCHLORIDE 100 MG: 10 INJECTION, SOLUTION EPIDURAL; INFILTRATION; INTRACAUDAL; PERINEURAL at 07:55:00

## 2024-11-25 NOTE — ANESTHESIA PREPROCEDURE EVALUATION
Anesthesia PreOp Note    HPI:     Armani Romero is a 74 year old male who presents for preoperative consultation requested by: Radha Dumont MD    Date of Surgery: 11/25/2024    Procedure(s):  COLONOSCOPY \ ESOPHAGOGASTRODUODENOSCOPY  ESOPHAGOGASTRODUODENOSCOPY (EGD)  Indication: Colon cancer screening / Gastric ulcer without hemorrhage or perforation, unspecified chronicity    Relevant Problems   No relevant active problems       NPO:  Last Liquid Consumption Date: 11/25/24  Last Liquid Consumption Time: 0300  Last Solid Consumption Date: 11/23/24  Last Solid Consumption Time: 0000  Last Liquid Consumption Date: 11/25/24          History Review:  Patient Active Problem List    Diagnosis Date Noted    Upper gastrointestinal hemorrhage 10/04/2024    Melena 10/04/2024    Non-recurrent bilateral inguinal hernia without obstruction or gangrene 01/24/2020    Ventral hernia with obstruction and without gangrene 01/24/2020    Chronic headaches 05/23/2016    Hypertension 07/24/2015    Diabetes (HCC) 07/24/2015       Past Medical History:    Anesthesia complication    unable to urinate    BPH (benign prostatic hyperplasia)    Diverticulosis of large intestine    Essential hypertension    High blood pressure    High cholesterol    Hyperlipidemia    Migraines    Osteoarthritis    neck    Type II or unspecified type diabetes mellitus without mention of complication, not stated as uncontrolled    controlled     Visual impairment    glasses       Past Surgical History:   Procedure Laterality Date    Appendectomy      Elbow surgery Bilateral     Hernia surgery      BIHR     Hernia surgery      UHR     Upper gi endoscopy,exam N/A     a month ago per patient       Prescriptions Prior to Admission[1]  Current Medications and Prescriptions Ordered in Epic[2]    Allergies[3]    Family History   Problem Relation Age of Onset    Diabetes Father     Heart Disorder Mother     Diabetes Mother      Social History     Socioeconomic  History    Marital status:     Number of children: 2   Occupational History    Occupation: retired    Tobacco Use    Smoking status: Never    Smokeless tobacco: Never   Vaping Use    Vaping status: Never Used   Substance and Sexual Activity    Alcohol use: No     Comment: non alcoholic beer    Drug use: No   Other Topics Concern    Caffeine Concern Yes     Comment: 1 cup of coffee daily     Exercise No       Available pre-op labs reviewed.  Lab Results   Component Value Date    WBC 11.5 (H) 10/05/2024    RBC 3.41 (L) 10/05/2024    HGB 10.5 (L) 10/05/2024    HCT 32.0 (L) 10/05/2024    MCV 93.8 10/05/2024    MCH 30.8 10/05/2024    MCHC 32.8 10/05/2024    RDW 13.6 10/05/2024    .0 10/05/2024     Lab Results   Component Value Date     10/05/2024    K 5.0 10/05/2024     10/05/2024    CO2 27.0 10/05/2024    BUN 24 (H) 10/05/2024    CREATSERUM 1.15 10/05/2024     (H) 10/05/2024    PGLU 157 (H) 10/05/2024    CA 9.2 10/05/2024          Vital Signs:  Body mass index is 31.57 kg/m².   height is 1.778 m (5' 10\") and weight is 99.8 kg (220 lb). His blood pressure is 139/83 and his pulse is 72. His respiration is 19 and oxygen saturation is 97%.   Vitals:    11/20/24 1520 11/25/24 0720   BP:  139/83   Pulse:  72   Resp:  19   SpO2:  97%   Weight: 99.8 kg (220 lb) 99.8 kg (220 lb)   Height: 1.778 m (5' 10\") 1.778 m (5' 10\")        Anesthesia Evaluation     Patient summary reviewed and Nursing notes reviewed    Airway   Mallampati: I  Dental      Pulmonary - negative ROS   Cardiovascular   Exercise tolerance: good  (+) hypertension    NYHA Classification: I    Neuro/Psych - negative ROS     GI/Hepatic/Renal - negative ROS     Endo/Other - negative ROS   (+) diabetes mellitus  Abdominal                  Anesthesia Plan:   ASA:  3  Plan:   MAC  Informed Consent Plan and Risks Discussed With:  Patient      I have informed Armani Romero and/or legal guardian or family member of the nature of the  anesthetic plan, benefits, risks including possible dental damage if relevant, major complications, and any alternative forms of anesthetic management.   All of the patient's questions were answered to the best of my ability. The patient desires the anesthetic management as planned.  Harinder Munguia MD  2024 7:28 AM  Present on Admission:  **None**           [1]   Medications Prior to Admission   Medication Sig Dispense Refill Last Dose/Taking    Acetaminophen (TYLENOL) 325 MG Oral Cap Take by mouth.   2024    [] pantoprazole 40 MG Oral Tab EC Take 1 tablet (40 mg total) by mouth 2 (two) times daily before meals. 60 tablet 0 Taking    SITagliptin Phosphate 100 MG Oral Tab Take 1 tablet (100 mg total) by mouth daily.   2024    glimepiride 2 MG Oral Tab Take 1 tablet (2 mg total) by mouth daily with breakfast.   2024 Morning    cholecalciferol (VITAMIN D3) 125 MCG (5000 UT) Oral Cap Take 1 capsule (5,000 Units total) by mouth Noon.   2024    rosuvastatin 10 MG Oral Tab Take 1 tablet (10 mg total) by mouth nightly.   2024    Multiple Vitamins-Minerals (MULTIVITAMIN ADULT OR) Take 1 tablet by mouth daily.   2024    Alfuzosin HCl ER 10 MG Oral Tablet 24 Hr 1 tablet (10 mg total) nightly.  1 2024    ramipril 10 MG Oral Cap Take 1 capsule (10 mg total) by mouth daily.       AmLODIPine Besylate 5 MG Oral Tab 1 tablet (5 mg total) daily.  1    [2]   Current Facility-Administered Medications Ordered in Epic   Medication Dose Route Frequency Provider Last Rate Last Admin    lactated ringers infusion   Intravenous Continuous Radha Dumont MD         No current [facility]-ordered outpatient medications on file.   [3]   Allergies  Allergen Reactions    Ciprofloxacin HIVES    Sulfa Antibiotics HIVES    Augmentin [Amoxicillin-Pot Clavulanate] PAIN     Pain in stomach     Gabapentin FATIGUE    Tetracycline HIVES    Vibramycin [Doxycycline] OTHER (SEE COMMENTS)     Caused high blood  pressure    Levaquin [Levofloxacin] DIARRHEA     Diarrhea, unless patient takes it with Florastor

## 2024-11-25 NOTE — H&P
HISTORY AND PHYSICAL FOR ENDOSCOPIC PROCEDURE      Armani Romero Patient Status:  Hospital Outpatient Surgery    10/7/1950 MRN [id number]   Location [facility] ENDOSCOPY LAB SUITES Attending Radha Dumont MD   Hosp Day # 0 PCP MATT LEDESMA MD     Date of Consult: 2024    Reason for Consultation:  H/o H pylori PUD, screening cscope    History of Present Illness:  Armani Romero is a a(n) 74 year old male. Last cscope  with sigmoid colitis. no FH of CRC. no blood thinners.  no Gi sx. Has since stopped ASA.  S/p triple therapy for H pylori.     History:  Past Medical History:    Anesthesia complication    unable to urinate    BPH (benign prostatic hyperplasia)    Diverticulosis of large intestine    Essential hypertension    High blood pressure    High cholesterol    Hyperlipidemia    Migraines    Osteoarthritis    neck    Type II or unspecified type diabetes mellitus without mention of complication, not stated as uncontrolled    controlled     Visual impairment    glasses     Past Surgical History:   Procedure Laterality Date    Appendectomy      Elbow surgery Bilateral     Hernia surgery      BIHR     Hernia surgery      UHR     Upper gi endoscopy,exam N/A     a month ago per patient     Family History   Problem Relation Age of Onset    Diabetes Father     Heart Disorder Mother     Diabetes Mother       reports that he has never smoked. He has never used smokeless tobacco. He reports that he does not drink alcohol and does not use drugs.    Allergies:  Allergies[1]    Medications:    Current Facility-Administered Medications:     lactated ringers infusion, , Intravenous, Continuous    Review of Systems:  Gastrointestinal: negative other than specified in the HPI  General: negative other than specified in the HPI  Neurological: negative other than specified in the HPI  Cardiovascular: negative other than specified in the HPI  Respiratory: negative other than specified in the  HPI  Skin: negative other than specified in the HPI  Allergy: negative other than specified in the HPI  ENT: negative other than specified in the HPI  Physical Exam:    Blood pressure 139/83, pulse 72, resp. rate 19, height 5' 10\" (1.778 m), weight 220 lb (99.8 kg), SpO2 97%.    General: Appears alert, oriented x3 and in no acute distress.  HEENT: Normal. No neck vein distention. Thyroid not enlarged.  No lymphadenopathy.  CV: S1 and S2 normal.  No murmurs or gallops.  Lungs: Clear to auscultation.  Abdomen: Soft and nondistended.  Nontender.  No masses.  Bowel sounds are present.  Back: No CVA tenderness.    Imaging:  none    Assessment/Plan:  Patient Active Problem List   Diagnosis    Hypertension    Diabetes (HCC)    Chronic headaches    Non-recurrent bilateral inguinal hernia without obstruction or gangrene    Ventral hernia with obstruction and without gangrene    Upper gastrointestinal hemorrhage    Melena         Radha Dumont DO  11/25/2024  7:31 AM         [1]   Allergies  Allergen Reactions    Ciprofloxacin HIVES    Sulfa Antibiotics HIVES    Augmentin [Amoxicillin-Pot Clavulanate] PAIN     Pain in stomach     Gabapentin FATIGUE    Tetracycline HIVES    Vibramycin [Doxycycline] OTHER (SEE COMMENTS)     Caused high blood pressure    Levaquin [Levofloxacin] DIARRHEA     Diarrhea, unless patient takes it with Florastor

## 2024-11-25 NOTE — DISCHARGE INSTRUCTIONS
ENDOSCOPY DISCHARGE INSTRUCTIONS    Procedure Performed:   Gastroscopy and Colonoscopy    Endoscopist: Radha Dumont DO  FINDINGS:   Hiatal hernia (stomach slides up into chest through diaphragm), Internal/external hemorrhoids, and Diverticulosis (pockets in colon that develop with age and lack of fiber intake)      Repeat next colonoscopy 10 years    MEDICATIONS:  You may resume all other medications today and you may decrease pantoprazole to daily AM dosing only for 2 weeks.  If no heartburn symptoms, indigestion after than can stop entirely    DIET:  Regular    BIOPSIES:  Biopsies were taken (you will be notified of results in 7-10 days)    X-RAYS:   No X-Rays were ordered today          Activity for remainder of today:    REST TODAY  DO NOT drive or operate heavy machinery  DO NOT drink any alcoholic beverages  DO NOT sign any legal documents or make any important decisions    After your procedure(s):  It is not unusual to feel bloated or gassy .  Passing gas and belching is encouraged. Lying on your left side with your knees flexed may relieve the discomfort. A hot pack to the abdomen may also help.    After your gastroscopy (upper endoscopy): You may experience a slight sore throat which will subside. Throat lozenges or salt water gargle can be used.    FOLLOW-UP:  Contact the office at [phone] for follow-up appointment is needed or if you develop any of the following:    Severe abdominal pain/discomfort     Excessive bleeding                     Black tarry stool    Difficulty breathing/swallowing      Persistent nausea/vomiting  Fever above 100 degrees or chills      Home Care Instructions for Colonoscopy and Gastroscopy with Sedation    Diet:  - Resume your regular diet as tolerated unless otherwise instructed.  - Start with light meals to minimize bloating.  - Do not drink alcohol today.    Medication:  - If you have questions about resuming your normal medications, please contact your Primary Care  Physician.    Activities:  - Take it easy today. Do not return to work today.  - Do not drive today.  - Do not operate any machinery today (including kitchen equipment).    Colonoscopy:  - You may notice some rectal \"spotting\" (a little blood on the toilet tissue) for a day or two after the exam. This is normal.  - If you experience any rectal bleeding (not spotting), persistent tenderness or sharp severe abdominal pains, oral temperature over 100 degrees Fahrenheit, light-headedness or dizziness, or any other problems, contact your doctor.    Gastroscopy:  - You may have a sore throat for 2-3 days following the exam. This is normal. Gargling with warm salt water (1/2 tsp salt to 1 glass warm water) or using throat lozenges will help.  - If you experience any sharp pain in your neck, abdomen or chest, vomiting of blood, oral temperature over 100 degrees Fahrenheit, light-headedness or dizziness, or any other problems, contact your doctor.    **If unable to reach your doctor, please go to the [facility] Emergency Room**    - Your referring physician will receive a full report of your examination.  - If you do not hear from your doctor's office within two weeks of your biopsy, please call them for your results.    You may be able to see your laboratory results in [employer] between 4 and 7 business days.  In some cases, your physician may not have viewed the results before they are released to [employer].  If you have questions regarding your results contact the physician who ordered the test/exam by phone or via [employer] by choosing \"Ask a Medical Question.\"

## 2024-11-25 NOTE — ANESTHESIA POSTPROCEDURE EVALUATION
Patient: Armani Romero    Procedure Summary       Date: 11/25/24 Room / Location: [facility] ENDOSCOPY 05 / [facility] ENDOSCOPY    Anesthesia Start: 0754 Anesthesia Stop: 0821    Procedures:       COLONOSCOPY \ ESOPHAGOGASTRODUODENOSCOPY      ESOPHAGOGASTRODUODENOSCOPY (EGD) Diagnosis:       Colon cancer screening      Gastric ulcer without hemorrhage or perforation, unspecified chronicity      (Hiatal Hernia, Diverticulosis, hemorrhoids)    Surgeons: Radha Dumont MD Anesthesiologist: Harinder Munguia MD    Anesthesia Type: MAC ASA Status: 3            Anesthesia Type: No value filed.    Vitals Value Taken Time   BP 99/67 11/25/24 0820   Temp 98 11/25/24 0821   Pulse 81 11/25/24 0821   Resp 22 11/25/24 0821   SpO2 99 % 11/25/24 0821   Vitals shown include unfiled device data.    [facility] AN Post Evaluation:   Patient Evaluated in PACU  Patient Participation: complete - patient participated  Level of Consciousness: awake  Airway Patency:patent  Dental exam unchanged from preop  Yes    Nausea/Vomiting: none  Cardiovascular Status: acceptable  Respiratory Status: acceptable  Postoperative Hydration acceptable      Harinder Munguia MD  11/25/2024 8:21 AM

## 2024-11-25 NOTE — OPERATIVE REPORT
EGD/Colonoscopy Operative Report    Armani Romero Patient Status:  Hospital Outpatient Surgery    10/7/1950 MRN [id number]   Location [facility] ENDOSCOPY LAB SUITES Attending Radha Dumont MD   Hosp Day #   0 PCP MATT LEDESMA MD     Pre-Operative Diagnosis: Colon cancer screening / Gastric ulcer without hemorrhage or perforation, unspecified chronicity , h/o H pylori s/p triple therapy    Post-Operative Diagnosis:    ESOPHAGUS:  normal.  Z Line regular 39cm.    STOMACH:  2cm sliding hiatal hernia.  Normal gastric mucosa without ulceration.  Biopsied for H pylori.   DUODENUM:   Normal to the second portion    COLON:      1.  Normal colon mucosa    2.  Moderate sized internal hemorrhoids, external hemorrhoids    3.  Sigmoid diverticulosis   TI: normal    Procedure Performed:   EGD with cold forceps biopsy  COLONOSCOPY   Informed Consent: Informed consent for both the procedure and sedation were obtained from the patient. The potentially life-threatening complications of sedation, bleeding,  perforation, transfusion or repeat endoscopy were reviewed along with the possible need for hospitalization, surgical management, transfusion or repeat endoscopy should one of these complications arise. The patient understands and is agreeable to proceed.  Sedation Type: MAC-Patient received sedation with monitored anesthesia provided by an anesthesiologist    Moderate Sedation Time: None.  Deep sedation provided by anesthesia.    Cecum Withdrawal Time:  7 min    Date of previous colonoscopy:     Procedure Description: The patient was placed in the left lateral decubitus position. A bite block was placed in the patient’s mouth. The endoscope was inserted through the mouth and advanced under direct visualization to the descending duodenum and was then withdrawn to examine the duodenal bulb and gastric  antrum.  The endoscope was then retroflexed to examine the angulus, GE junction, cardia, body and fundus,  then withdrawn to examine the esophagus. The endoscope was then removed from the patient. The patient tolerated the procedure well with no immediate complications. The patient was then repositioned for colonoscopy.  After careful digital rectal examination, the Adult colonoscope was inserted into the rectum and advanced to the level of the cecum under direct visualization. The cecum was identified by landmarks, including the appendiceal orifice and ileoceccal valve. The TI was intubated. Careful examination of the entire colon was performed during withdrawal of the endoscope. The scope was withdrawn to the rectum and retroflexion was performed.  The patient tolerated the procedure well with no immediate complications. The patient was transferred to the recovery area in stable condition.   Quality of Preparation: Adequate  Aronchick Bowel Prep Scale:  1    Findings:    ESOPHAGUS:  normal.  Z Line regular 39cm.    STOMACH:  2cm sliding hiatal hernia.  Normal gastric mucosa without ulceration.  Biopsied for H pylori.   DUODENUM:   Normal to the second portion    COLON:      1.  Normal colon mucosa    2.  Moderate sized internal hemorrhoids, external hemorrhoids    3.  Sigmoid diverticulosis   TI: normal  Recommendations: f/u pathology. Avoid NSAIDs. Drop protonix down to daily AM dosing for 2 weeks then stop entirely if no recurrent symptoms.  Repeat cscope in 10 years  Discharge:  The patient was given an after visit summary detailing the procedure, findings, recommendations and follow up plans.  Radha Dumont DO  11/25/2024  8:00 AM

## (undated) DEVICE — 3M(TM) TEGADERM(TM) TRANSPARENT FILM DRESSING FRAME STYLE 1628: Brand: 3M™ TEGADERM™

## (undated) DEVICE — SUTURE SILK 2-0 SH

## (undated) DEVICE — KIT CLEAN ENDOKIT 1.1OZ GOWNX2

## (undated) DEVICE — V2 SPECIMEN COLLECTION MANIFOLD KIT: Brand: NEPTUNE

## (undated) DEVICE — SUCTION IRRIGATOR: Brand: ENDOWRIST

## (undated) DEVICE — SUCTION/IRRIGATOR: Brand: ENDOWRIST;DAVINCI SI

## (undated) DEVICE — CONMED SCOPE SAVER BITE BLOCK, 20X27 MM: Brand: SCOPE SAVER

## (undated) DEVICE — ARM DRAPE

## (undated) DEVICE — BLADELESS OBTURATOR: Brand: WECK VISTA

## (undated) DEVICE — GAMMEX® PI HYBRID SIZE 7.5, STERILE POWDER-FREE SURGICAL GLOVE, POLYISOPRENE AND NEOPRENE BLEND: Brand: GAMMEX

## (undated) DEVICE — 3M™ TEGADERM™ TRANSPARENT FILM DRESSING, 1626W, 4 IN X 4-3/4 IN (10 CM X 12 CM), 50 EACH/CARTON, 4 CARTON/CASE: Brand: 3M™ TEGADERM™

## (undated) DEVICE — DRAPE SHEET LAPAROTOMY

## (undated) DEVICE — TIP COVER ACCESSORY

## (undated) DEVICE — DRAPE C-ARM UNIVERSAL

## (undated) DEVICE — GOWN SURG AERO BLUE PERF LG

## (undated) DEVICE — MEGA NEEDLE DRIVER: Brand: ENDOWRIST

## (undated) DEVICE — ENCORE® LATEX MICRO SIZE 7.5, STERILE LATEX POWDER-FREE SURGICAL GLOVE: Brand: ENCORE

## (undated) DEVICE — CADIERE FORCEPS: Brand: ENDOWRIST;DAVINCI SI

## (undated) DEVICE — COLUMN DRAPE

## (undated) DEVICE — CANNULA SEAL

## (undated) DEVICE — CO2 CANNULA,SSOFT,ADLT,7O2,4CO2,FEMALE: Brand: MEDLINE

## (undated) DEVICE — MEDI-VAC NON-CONDUCTIVE SUCTION TUBING: Brand: CARDINAL HEALTH

## (undated) DEVICE — ENCORE® LATEX ACCLAIM SIZE 7.5, STERILE LATEX POWDER-FREE SURGICAL GLOVE: Brand: ENCORE

## (undated) DEVICE — MEGA NEEDLE DRIVER: Brand: ENDOWRIST;DAVINCI SI

## (undated) DEVICE — SYRINGE, LUER SLIP, STERILE, 60ML: Brand: MEDLINE

## (undated) DEVICE — [HIGH FLOW INSUFFLATOR,  DO NOT USE IF PACKAGE IS DAMAGED,  KEEP DRY,  KEEP AWAY FROM SUNLIGHT,  PROTECT FROM HEAT AND RADIOACTIVE SOURCES.]: Brand: PNEUMOSURE

## (undated) DEVICE — MONOPOLAR CURVED SCISSORS: Brand: ENDOWRIST

## (undated) DEVICE — ROBOTIC: Brand: MEDLINE INDUSTRIES, INC.

## (undated) DEVICE — 3M™ IOBAN™ 2 ANTIMICROBIAL INCISE DRAPE 6650EZ: Brand: IOBAN™ 2

## (undated) DEVICE — VISUALIZATION SYSTEM: Brand: CLEARIFY

## (undated) DEVICE — SUTURE VICRYL 3-0 J442H

## (undated) DEVICE — DV SEAL CANNULA 8.5-13MM

## (undated) DEVICE — GIJAW SINGLE-USE BIOPSY FORCEPS WITH NEEDLE: Brand: GIJAW

## (undated) DEVICE — KIT ENDO ORCAPOD 160/180/190

## (undated) DEVICE — SOL  .9 1000ML BTL

## (undated) DEVICE — 40580 - THE PINK PAD - ADVANCED TRENDELENBURG POSITIONING KIT: Brand: 40580 - THE PINK PAD - ADVANCED TRENDELENBURG POSITIONING KIT

## (undated) DEVICE — PERMANENT CAUTERY HOOK: Brand: ENDOWRIST;DAVINCI SI

## (undated) DEVICE — SUTURE VICRYL 2-0 CT-1

## (undated) DEVICE — MEDI-VAC NON-CONDUCTIVE SUCTION TUBING 6MM X 1.8M (6FT.) L: Brand: CARDINAL HEALTH

## (undated) DEVICE — DRAPE SHEET LAPCHOLE 124X100X7

## (undated) DEVICE — SUTURE VLOC 90 2-0 9\" 2145

## (undated) DEVICE — SOL  .9 3000ML

## (undated) DEVICE — SUTURE ETHIBOND 0 CX31D

## (undated) DEVICE — ADHESIVE MASTISOL 2/3CC VL

## (undated) DEVICE — MINOR GENERAL: Brand: MEDLINE INDUSTRIES, INC.

## (undated) DEVICE — CADIERE FORCEPS: Brand: ENDOWRIST

## (undated) DEVICE — DV KIT ACCESSORY 3-ARM DISP

## (undated) DEVICE — TRAY FOLEY BDX 16F STATLOCK

## (undated) DEVICE — LARGE NEEDLE DRIVER: Brand: ENDOWRIST;DAVINCI SI

## (undated) NOTE — ED AVS SNAPSHOT
[facility]    [address]     [address] [place] [address]    Phone:  [phone]    Fax:  [phone]           Marty Montenegro   MRN: [id number]    Department:  [facility]   Date of Visit:  6/ and Class Registration line at [phone] or find a doctor online by visiting [email or web address].    IF THERE IS ANY CHANGE OR WORSENING OF YOUR CONDITION, CALL YOUR PRIMARY CARE PHYSICIAN AT ONCE OR RETURN IMMEDIATELY TO [address] [place] [address].     If

## (undated) NOTE — LETTER
[facility]  Administration of Anesthesia  IArmani agree to be cared for by a physician anesthesiologist alone and/or with a nurse anesthetist, who is specially trained to monitor me and give me medicine to put me to sleep or keep me comfortable during my procedure    I understand that my anesthesiologist and/or anesthetist is not an employee or agent of [facility] or [employer]. He or she works for [facility]    As the patient asking for anesthesia services, I agree to:  Allow the anesthesiologist (anesthesia doctor) to give me medicine and do additional procedures as necessary. Some examples are: Starting or using an “IV” to give me medicine, fluids or blood during my procedure, and having a breathing tube placed to help me breathe when I’m asleep (intubation). In the event that my heart stops working properly, I understand that my anesthesiologist will make every effort to sustain my life, unless otherwise directed by [facility] Do Not Resuscitate documents.  Tell my anesthesia doctor before my procedure:  If I am pregnant.  The last time that I ate or drank.  iii. All of the medicines I take (including prescriptions, herbal supplements, and pills I can buy without a prescription (including street drugs/illegal medications). Failure to inform my anesthesiologist about these medicines may increase my risk of anesthetic complications.  iv.If I am allergic to anything or have had a reaction to anesthesia before.  I understand how the anesthesia medicine will help me (benefits).  I understand that with any type of anesthesia medicine there are risks:  The most common risks are: nausea, vomiting, sore throat, muscle soreness, damage to my eyes, mouth, or teeth (from breathing tube placement).  Rare risks include: remembering what happened during my procedure, allergic reactions to medications, injury to my airway, heart, lungs, vision, nerves, or  muscles and in extremely rare instances death.  My doctor has explained to me other choices available to me for my care (alternatives).  Pregnant Patients (“epidural”):  I understand that the risks of having an epidural (medicine given into my back to help control pain during labor), include itching, low blood pressure, difficulty urinating, headache or slowing of the baby’s heart. Very rare risks include infection, bleeding, seizure, irregular heart rhythms and nerve injury.  Regional Anesthesia (“spinal”, “epidural”, & “nerve blocks”):  I understand that rare but potential complications include headache, bleeding, infection, seizure, irregular heart rhythms, and nerve injury.    _____________________________________________________________________________  Patient (or Representative) Signature/Relationship to Patient  Date   Time    _____________________________________________________________________________   Name (if used)    Language/Organization   Time    _____________________________________________________________________________  Nurse Anesthetist Signature     Date   Time  _____________________________________________________________________________  Anesthesiologist Signature     Date   Time  I have discussed the procedure and information above with the patient (or patient’s representative) and answered their questions. The patient or their representative has agreed to have anesthesia services.    _____________________________________________________________________________  Witness        Date   Time  I have verified that the signature is that of the patient or patient’s representative, and that it was signed before the procedure  Patient Name: Armani Romero     : 10/7/1950                 Printed: 2024 at 7:39 AM    Medical Record #: [id number]                                            Page 1 of 1  ----------ANESTHESIA CONSENT----------

## (undated) NOTE — LETTER
[place] [address]  Authorization for Invasive Procedures  Date: 10/5/24           Time: 9:30    I hereby authorize   Radha Dumont MD   , my physician and his/her assistants (if applicable), which may include medical students, residents, and/or fellows, to perform the following surgical operation/ procedure and administer such anesthesia as may be determined necessary by my physician:   Esophagogastroduodenoscopy    on  Armani Romero  2.   I recognize that during the surgical operation/procedure, unforeseen conditions may necessitate additional or different procedures than those listed above.  I, therefore, further authorize and request that the above-named surgeon, assistants, or designees perform such procedures as are, in their judgment, necessary and desirable.    3.   My surgeon/physician has discussed prior to my surgery the potential benefits, risks and side effects of this procedure; the likelihood of achieving goals; and potential problems that might occur during recuperation.  They also discussed reasonable alternatives to the procedure, including risks, benefits, and side effects related to the alternatives and risks related to not receiving this procedure.  I have had all my questions answered and I acknowledge that no guarantee has been made as to the result that may be obtained.    4.   Should the need arise during my operation/procedure, which includes change of level of care prior to discharge, I also consent to the administration of blood and/or blood products.  Further, I understand that despite careful testing and screening of blood or blood products by collecting agencies, I may still be subject to ill effects as a result of receiving a blood transfusion and/or blood products.  The following are some, but not all, of the potential risks that can occur: fever and allergic reactions, hemolytic reactions, transmission of diseases such as Hepatitis, AIDS and  Cytomegalovirus (CMV) and fluid overload.  In the event that I wish to have an autologous transfusion of my own blood, or a directed donor transfusion, I will discuss this with my physician.   Check only if Refusing Blood or Blood Products  I understand refusal of blood or blood products as deemed necessary by my physician may have serious consequences to my condition to include possible death. I hereby assume responsibility for my refusal and release the hospital, its personnel, and my physicians from any responsibility for the consequences of my refusal.    o  Refuse         5.   I authorize the use of any specimen, organs, tissues, body parts or foreign objects that may be removed from my body during the operation/procedure for diagnosis, research or teaching purposes and their subsequent disposal by hospital authorities.  I also authorize the release of specimen test results and/or written reports to my treating physician on the hospital medical staff or other referring or consulting physicians involved in my care, at the discretion of the Pathologist or my treating physician.    6.   I consent to the photographing or videotaping of the operations or procedures to be performed, including appropriate portions of my body for medical, scientific, or educational purposes, provided my identity is not revealed by the pictures or by descriptive texts accompanying them.  If the procedure has been photographed/videotaped, the surgeon will obtain the original picture, image, videotape or CD.  The hospital will not be responsible for storage, release or maintenance of the picture, image, tape or CD.    7.   I consent to the presence of a  or observers in the operating room as deemed necessary by my physician or their designees.    8.   I recognize that in the event my procedure results in extended X-Ray/fluoroscopy time, I may develop a skin reaction.    9. If I have a Do Not Attempt Resuscitation (DNAR)  order in place, that status will be suspended while in the operating room, procedural suite, and during the recovery period unless otherwise explicitly stated by me (or a person authorized to consent on my behalf). The surgeon or my attending physician will determine when the applicable recovery period ends for purposes of reinstating the DNAR order.  10. Patients having a sterilization procedure: I understand that if the procedure is successful the results will be permanent and it will therefore be impossible for me to inseminate, conceive, or bear children.  I also understand that the procedure is intended to result in sterility, although the result has not been guaranteed.   11. I acknowledge that my physician has explained sedation/analgesia administration to me including the risk and benefits I consent to the administration of sedation/analgesia as may be necessary or desirable in the judgment of my physician.    I CERTIFY THAT I HAVE READ AND FULLY UNDERSTAND THE ABOVE CONSENT TO OPERATION and/or OTHER PROCEDURE.        ____________________________________       _________________________________      ______________________________  Signature of Patient         Signature of Responsible Person        Printed Name of Responsible Person        ____________________________________      _________________________________      ______________________________       Signature of Witness          Relationship to Patient                       Date                                       Time  Patient Name: Armani Romero  : 10/7/1950    Reviewed: 2024   Printed: 2024  Medical Record #: [id number] Page 1 of 2             STATEMENT OF PHYSICIAN My signature below affirms that prior to the time of the procedure; I have explained to the patient and/or his/her legal representative, the risks and benefits involved in the proposed treatment and any reasonable alternative to the proposed treatment. I have  also explained the risks and benefits involved in refusal of the proposed treatment and alternatives to the proposed treatment and have answered the patient's questions. If I have a significant financial interest in a co-management agreement or a significant financial interest in any product or implant, or other significant relationship used in this procedure/surgery, I have disclosed this and had a discussion with my patient.     _______________________________________________________________ _____________________________  (Signature of Physician)                                                                                         (Date)                                   (Time)  Patient Name: Armani Romero  : 10/7/1950    Reviewed: 2024   Printed: 2024  Medical Record #: [id number] Page 2 of 2

## (undated) NOTE — LETTER
Hospital Discharge Documentation    From: [facility] Hospitalist's Office  Phone: [phone]    Patient discharged time/date: 10/5/2024  2:29 PM  Patient discharge disposition:  Home or Self Care       Discharge Summary - D/C Summary        Discharge Summary signed by Willy Leal MD at 10/5/2024  1:57 PM  Version 1 of 1      Author: Willy Leal MD Service: Hospitalist Author Type: Physician    Filed: 10/5/2024  1:57 PM Date of Service: 10/5/2024  1:56 PM Status: Signed    : Willy Leal MD (Physician)           [facility]  part of [facility]     DISCHARGE SUMMARY     Armani Romero Patient Status:  Observation    10/7/1950 MRN [id number]   Location [facility] 5SW/SE Attending Willy Leal MD   Hosp Day # 1 PCP MATT LEDESMA MD     DATE OF ADMISSION: 10/4/2024  DATE OF DISCHARGE:  10/05/24  DISPOSITION: home  CONDITION ON DISCHARGE: good    DISCHARGE DIAGNOSES:  Upper GI hemorrhage  Gastric ulcer  Acute blood loss anemia  Type 2 diabetes  Hypertension  Dyslipidemia  BPH  Obesity BMI 31    Post-Op Diagnosis:                ESOPHAGUS:  normal. Z line regular 40cm.               STOMACH:  distal antrum/prepyloric erythema with prepyloric < 10mm clean based (shreya class III) ulcer.  No heme seen.                DUODENUM:   Normal to the second portion.     HISTORY OF PRESENT ILLNESS (COPIED FROM ADMISSION H&P)  Patient is a 73-year-old  male, came into the emergency department for evaluation of black, tarry, foul odor bowel movements since last night. CBC showed hemoglobin of 11.3, dropped from 13.5 baseline; white blood cell count of 12.7 with left shift. Chemistry showed BUN-to-creatinine ratio of 35.5, normal GFR. Liver function tests were normal. Patient was initiated on IV Protonix and Reglan in the emergency room, and he will be admitted to the hospital for further management.     HOSPITAL COURSE:  Patient was admitted, did not require  transfusion.  He was hemodynamically stable.  Seen by GI and taken for EGD with the above results.  This is almost certainly NSAID induced.  I have asked him to stop taking aspirin as there is no indication for this in his medical history.  He will take Protonix twice daily, follow-up with his primary gastroenterologist Dr. Anguiano.  He is overdue for colonoscopy as well.    Patient understands return to the emergency room for increased pain, fever, discharge, shortness of breath, chest pain, new neurologic symptoms, other concerning symptoms.    PHYSICAL EXAM:  Temp:  [97.7 °F (36.5 °C)-98.2 °F (36.8 °C)] 97.7 °F (36.5 °C)  Pulse:  [89-98] 89  Resp:  [16-25] 20  BP: ()/(46-80) 109/78  SpO2:  [95 %-99 %] 98 %  Gen: A+Ox3.  No distress.   HEENT: NCAT, neck supple, no carotid bruit.  CV: RRR, S1S2, and intact distal pulses. No gallop, rub, murmur.  Pulm: Effort and breath sounds normal. No distress, wheezes, rales, rhonchi.  Abd: Soft, NTND, BS normal, no mass, no HSM, no rebound/guarding.   Neuro: Normal reflexes, CN. Sensory/motor exams grossly normal deficit. Coordination  and gait normal.   MS: No joint effusions.  No peripheral edema.  Skin: Skin is warm and dry. No rashes, erythema, diaphoresis.   Psych: Normal mood and affect. Behavior and judgment normal.     DISCHARGE MEDICATIONS     Discharge Medications        START taking these medications        Instructions Prescription details   pantoprazole 40 MG Tbec  Commonly known as: Protonix      Take 1 tablet (40 mg total) by mouth 2 (two) times daily before meals.   Stop taking on: November 4, 2024  Quantity: 60 tablet  Refills: 0            CONTINUE taking these medications        Instructions Prescription details   alfuzosin ER 10 MG Tb24  Commonly known as: Uroxatral ER      1 tablet (10 mg total) nightly.   Refills: 1     amLODIPine 5 MG Tabs  Commonly known as: Norvasc      1 tablet (5 mg total) daily.   Refills: 1     cholecalciferol 125 MCG (5000 UT)  Caps  Commonly known as: Vitamin D3      Take 1 capsule (5,000 Units total) by mouth Noon.   Refills: 0     glimepiride 2 MG Tabs  Commonly known as: Amaryl      Take 1 tablet (2 mg total) by mouth daily with breakfast.   Refills: 0     MULTIVITAMIN ADULT OR      Take 1 tablet by mouth daily.   Refills: 0     ramipril 10 MG Caps  Commonly known as: Altace      Take 1 capsule (10 mg total) by mouth daily.   Refills: 0     rosuvastatin 10 MG Tabs  Commonly known as: Crestor      Take 1 tablet (10 mg total) by mouth nightly.   Refills: 0     SITagliptin Phosphate 100 MG Tabs  Commonly known as: JANUVIA      Take 1 tablet (100 mg total) by mouth daily.   Refills: 0            STOP taking these medications      aspirin 500 MG Tabs                  Where to Get Your Medications        These medications were sent to [id number] - [place], [place] - [address] AT [facility] OF [address], [phone], [phone]  [address] [address]      Phone: [phone]   pantoprazole 40 MG [facility]         CONSULTANTS  Consultants         Provider   Role Specialty     Radha Dumont MD      Consulting Physician GASTROENTEROLOGY            FOLLOW UP:  Dani Darling MD  [address] [facility] [address]  [facility] [id number] [phone]    Follow up in 1 week(s)  Post Discharge Followup    Sonu Anguiano MD  [address] [facility]  [address]  [facility] [id number] [phone]    Follow up in 2 week(s)  Post Discharge Followup    The above plan and follow-up instructions were reviewed with the patient and they verbalized understanding and agreement.  They understand to return to the emergency room for any concerning signs or symptoms.  Greater than 30 minutes spent on discharge.  -----------------------    Hospital Discharge Diagnoses: Gastric ulcer    Lace+ Score: 45  59-90 High Risk  29-58 Medium Risk  0-28   Low Risk.    TCM Follow-Up Recommendation:  LACE 29-58: Moderate Risk  of readmission after discharge from the hospital.    Electronically signed by Willy Leal MD on 10/5/2024  1:57 PM

## (undated) NOTE — LETTER
[facility]  Administration of Anesthesia  IArmani agree to be cared for by a physician anesthesiologist alone and/or with a nurse anesthetist, who is specially trained to monitor me and give me medicine to put me to sleep or keep me comfortable during my procedure    I understand that my anesthesiologist and/or anesthetist is not an employee or agent of [facility] or [employer]. He or she works for [facility] [facility]    As the patient asking for anesthesia services, I agree to:  Allow the anesthesiologist (anesthesia doctor) to give me medicine and do additional procedures as necessary. Some examples are: Starting or using an “IV” to give me medicine, fluids or blood during my procedure, and having a breathing tube placed to help me breathe when I’m asleep (intubation). In the event that my heart stops working properly, I understand that my anesthesiologist will make every effort to sustain my life, unless otherwise directed by [facility] Do Not Resuscitate documents.  Tell my anesthesia doctor before my procedure:  If I am pregnant.  The last time that I ate or drank.  iii. All of the medicines I take (including prescriptions, herbal supplements, and pills I can buy without a prescription (including street drugs/illegal medications). Failure to inform my anesthesiologist about these medicines may increase my risk of anesthetic complications.  iv.If I am allergic to anything or have had a reaction to anesthesia before.  I understand how the anesthesia medicine will help me (benefits).  I understand that with any type of anesthesia medicine there are risks:  The most common risks are: nausea, vomiting, sore throat, muscle soreness, damage to my eyes, mouth, or teeth (from breathing tube placement).  Rare risks include: remembering what happened during my procedure, allergic reactions to medications, injury to my airway, heart, lungs, vision, nerves, or  muscles and in extremely rare instances death.  My doctor has explained to me other choices available to me for my care (alternatives).  Pregnant Patients (“epidural”):  I understand that the risks of having an epidural (medicine given into my back to help control pain during labor), include itching, low blood pressure, difficulty urinating, headache or slowing of the baby’s heart. Very rare risks include infection, bleeding, seizure, irregular heart rhythms and nerve injury.  Regional Anesthesia (“spinal”, “epidural”, & “nerve blocks”):  I understand that rare but potential complications include headache, bleeding, infection, seizure, irregular heart rhythms, and nerve injury.    _____________________________________________________________________________  Patient (or Representative) Signature/Relationship to Patient  Date   Time    _____________________________________________________________________________   Name (if used)    Language/Organization   Time    _____________________________________________________________________________  Nurse Anesthetist Signature     Date   Time  _____________________________________________________________________________  Anesthesiologist Signature     Date   Time  I have discussed the procedure and information above with the patient (or patient’s representative) and answered their questions. The patient or their representative has agreed to have anesthesia services.    _____________________________________________________________________________  Witness        Date   Time  I have verified that the signature is that of the patient or patient’s representative, and that it was signed before the procedure  Patient Name: Armani Romero     : 10/7/1950                 Printed: 10/5/2024 at 9:06 AM    Medical Record #: [id number]                                            Page 1 of 1  ----------ANESTHESIA CONSENT----------

## (undated) NOTE — Clinical Note
[address] [facility]  [address], [address]  [address] [place] [address] [phone]        Dear Hair Moreno MD,      I had the pleasure of seeing your patient, Jayshree Rodriguez on 5/8/2017.      Below please find a s because he does have a history of kidney stones. He denies any focal neurologic complaints. Current Outpatient Prescriptions:  Butalbital-APAP-Caffeine -40 MG Oral Tab Take 1 tablet by mouth every 6 (six) hours as needed for Pain.  Disp: 60 tablet Vitals:  /76 mmHg  Pulse 84  Resp 16  Ht 70\"  Wt 203 lb  BMI 29.13 kg/m2   General appearance: In no distress. He has mild restriction of neck extension. No Lhermitte sign. CV: No  Evidence of Carotid Bruits, Regular Rate and Rhythm. Neuro:   Hi

## (undated) NOTE — LETTER
[address], [address], [address] [facility], [address] [place] [address], [address]  . [address]  [phone]        Dear Mike Tavera MD,      I had the pleasure of seeing your patient, Zhao Gutiérrez on 3/6/2018.      Below mid cervical region but no progression compared to 2014. His last sedimentation rate in 2015 was normal at 6 mm. Current Outpatient Prescriptions:  Aspirin-Acetaminophen-Caffeine (EXCEDRIN MIGRAINE OR) Take by mouth.  Disp:  Rfl:    pregabalin (LYRICA SKIN: denies any unusual skin lesions or rashes  EYES: no visual complaints or deficits  HEENT: denies nasal congestion, sinus pain or sore throat; hearing loss negative  RESPIRATORY: denies shortness of breath, wheezing or cough   CARDIOVASCULAR: denies c treatment options, and elected to try Lyrica, since he did have a brief response to gabapentin in the past.  I suggested starting 75 mg at bedtime, and he can increase to twice daily if he is tolerating it.   I asked him to call me in 1 month to review his

## (undated) NOTE — MR AVS SNAPSHOT
[facility]  [address], [address] [facility]  Ashley Cruz [phone]               Thank you for choosing us for your health care visit with New Woody MD.  We are glad to serve you and happy to provide you with this summary of your of individual in advance and they must present an ID as well. ? The name of the person picking up your prescription must be documented in your chart.   Scheduling Tests    If your physician has ordered radiology tests such as MRI or CT scans, do not schedu Alfuzosin HCl ER 10 MG Tb24   TK 1 T PO D   Commonly known as:  UROXATRAL           Cyclobenzaprine HCl 5 MG Tabs   Take 5 mg by mouth as needed. Commonly known as:  FLEXERIL           EXCEDRIN MIGRAINE OR   Take  by mouth.            JANUVIA 10 HOW TO GET STARTED: HOW TO STAY MOTIVATED:   Start activities slowly and build up over time Do what you like   Get your heart pumping – brisk walking, biking, swimming Even 10 minute increments are effective and add up over the week   2 ½ hours per week –

## (undated) NOTE — MR AVS SNAPSHOT
[facility]  [address], [address] [facility]  [address] [facility] [phone]               Thank you for choosing us for your health care visit with Axel Thayer MD, MD.  We are glad to serve you and happy to provide you with this summary o Where to Get Your Medications      These medications were sent to [id number] [place] [address], [address], [phone], [phone] [address] [facility], [facility] [facility] [address]     Phone:  [phone]

## (undated) NOTE — LETTER
[facility]  [address], [place], [place]    Authorization for Surgical Operation and Procedure                               I hereby authorize Radha Dumont MD, my physician and his/her assistants (if applicable), which may include medical students, residents, and/or fellows, to perform the following surgical operation/ procedure and administer such anesthesia as may be determined necessary by my physician: Operation/Procedure name (s) COLONOSCOPY \ ESOPHAGOGASTRODUODENOSCOPY on Armani Romero   2.   I recognize that during the surgical operation/procedure, unforeseen conditions may necessitate additional or different procedures than those listed above.  I, therefore, further authorize and request that the above-named surgeon, assistants, or designees perform such procedures as are, in their judgment, necessary and desirable.    3.   My surgeon/physician has discussed prior to my surgery the potential benefits, risks and side effects of this procedure; the likelihood of achieving goals; and potential problems that might occur during recuperation.  They also discussed reasonable alternatives to the procedure, including risks, benefits, and side effects related to the alternatives and risks related to not receiving this procedure.  I have had all my questions answered and I acknowledge that no guarantee has been made as to the result that may be obtained.    4.   Should the need arise during my operation/procedure, which includes change of level of care prior to discharge, I also consent to the administration of blood and/or blood products.  Further, I understand that despite careful testing and screening of blood or blood products by collecting agencies, I may still be subject to ill effects as a result of receiving a blood transfusion and/or blood products.  The following are some, but not all, of the potential risks that can occur: fever and allergic reactions, hemolytic reactions,  transmission of diseases such as Hepatitis, AIDS and Cytomegalovirus (CMV) and fluid overload.  In the event that I wish to have an autologous transfusion of my own blood, or a directed donor transfusion, I will discuss this with my physician.  Check only if Refusing Blood or Blood Products  I understand refusal of blood or blood products as deemed necessary by my physician may have serious consequences to my condition to include possible death. I hereby assume responsibility for my refusal and release the hospital, its personnel, and my physicians from any responsibility for the consequences of my refusal.    o  Refuse   5.   I authorize the use of any specimen, organs, tissues, body parts or foreign objects that may be removed from my body during the operation/procedure for diagnosis, research or teaching purposes and their subsequent disposal by hospital authorities.  I also authorize the release of specimen test results and/or written reports to my treating physician on the hospital medical staff or other referring or consulting physicians involved in my care, at the discretion of the Pathologist or my treating physician.    6.   I consent to the photographing or videotaping of the operations or procedures to be performed, including appropriate portions of my body for medical, scientific, or educational purposes, provided my identity is not revealed by the pictures or by descriptive texts accompanying them.  If the procedure has been photographed/videotaped, the surgeon will obtain the original picture, image, videotape or CD.  The hospital will not be responsible for storage, release or maintenance of the picture, image, tape or CD.    7.   I consent to the presence of a  or observers in the operating room as deemed necessary by my physician or their designees.    8.   I recognize that in the event my procedure results in extended X-Ray/fluoroscopy time, I may develop a skin reaction.    9. If  I have a Do Not Attempt Resuscitation (DNAR) order in place, that status will be suspended while in the operating room, procedural suite, and during the recovery period unless otherwise explicitly stated by me (or a person authorized to consent on my behalf). The surgeon or my attending physician will determine when the applicable recovery period ends for purposes of reinstating the DNAR order.  10. Patients having a sterilization procedure: I understand that if the procedure is successful the results will be permanent and it will therefore be impossible for me to inseminate, conceive, or bear children.  I also understand that the procedure is intended to result in sterility, although the result has not been guaranteed.   11. I acknowledge that my physician has explained sedation/analgesia administration to me including the risk and benefits I consent to the administration of sedation/analgesia as may be necessary or desirable in the judgment of my physician.    I CERTIFY THAT I HAVE READ AND FULLY UNDERSTAND THE ABOVE CONSENT TO OPERATION and/or OTHER PROCEDURE.     ____________________________________  _________________________________        ______________________________  Signature of Patient    Signature of Responsible Person                Printed Name of Responsible Person                                      ____________________________________  _____________________________                ________________________________  Signature of Witness        Date  Time         Relationship to Patient    STATEMENT OF PHYSICIAN My signature below affirms that prior to the time of the procedure; I have explained to the patient and/or his/her legal representative, the risks and benefits involved in the proposed treatment and any reasonable alternative to the proposed treatment. I have also explained the risks and benefits involved in refusal of the proposed treatment and alternatives to the proposed treatment and have  answered the patient's questions. If I have a significant financial interest in a co-management agreement or a significant financial interest in any product or implant, or other significant relationship used in this procedure/surgery, I have disclosed this and had a discussion with my patient.     _____________________________________________________              _____________________________  (Signature of Physician)                                                                                         (Date)                                   (Time)  Patient Name: Armani WASSERMAN Romero      : 10/7/1950      Printed: 2024     Medical Record #: [id number]                                      Page 1 of 1

## (undated) NOTE — ED AVS SNAPSHOT
[facility] Emergency Department    [address]     [address] [place] [address]    Phone:  [phone]    Fax:  [phone]           Wynasracarmella Clancy   MRN: [id number]    Department:  [facility] Emergency Department   Date of Visit:  6/ HIGH BLOOD PRESSURE (HYPERTENSION), DISCHARGE INSTRUCTIONS (ENGLISH)      Disclosure     Insurance plans vary and the physician(s) referred by the ER may not be covered by your plan.  Please contact your insurance company to determine coverage and benefits If you have been prescribed any medication(s), please fill your prescription right away and begin taking the medication(s) as directed.   If you believe that any of the medications or instructions on this list is different from what your Primary Care doctor - If you don’t have insurance, Anca Zapata has partnered with [facility] to help you get signed up for insurance coverage.   [facility] is a Federal Navigator program that can help with your Affordable Care Act cover

## (undated) NOTE — LETTER
[facility]   Administracion de Anestesia  Yo, Armani Romero, acepto ser atendido por un anestesiólogo, quien está especialmente capacitado para monitorearme y darme medicamento para hacerme dormir o mantenerme cómodo suzette mi procedimiento.   Entiendo que mi anestesiólogo no es un empleado o agente de Health system o Health Services. Él o yemi trabaja para [facility]  [place] el paciente que solicita los servicios de anestesia, estoy de acuerdo con lo siguiente:  Permitir al anestesiólogo (médico de anestesia) que me suministre el medicamento y hacer los procedimientos adicionales que camille necesarios. Algunos ejemplos son: Al iniciar o utilizar keyshawn “IV” para suministrarme medicamentos, fluidos o bria suzette mi procedimiento, y colocarme keyshawn sonda de respiración, me ayudará a respirar cuando esté dormido (intubación). En el devan de que mi corazón deje de funcionar correctamente, entiendo que mi anestesiólogo hará todo lo posible para salvar mi simon, a menos que los documentos de No resucitar de Health system lo indiquen de otra manera.  Informar a mi anestesista antes del procedimiento:  Si estoy embarazada.  La última vez que comí o bebí algo.  Todos los medicamentos que bertha (incluyendo medicamentos recetados, suplementos herbales y pastillas que puedo comprar sin receta médica (incluyendo drogas en la valdivia [medicamentos ilegales]). No informar a mi anestesiólogo acerca de estos medicamentos puede aumentar mi riesgo de complicaciones con la anestesia.  Si soy alérgico a cualquier cosa o he tenido previamente keyshawn reacción adversa a la anestesia.  Entiendo cómo la anestesia me ayudará (beneficios).  Entiendo que con cualquier tipo de anestesia hay riesgos:  Los riesgos más comunes son: náuseas, vómitos, dolor de garganta, dolor muscular, daño a los ojos, la boca o los dientes (por la colocación de la sonda de respiración).  Los riesgos poco comunes incluyen:  recordar lo que sucedió suzette mi procedimiento, reacciones alérgicas a los medicamentos, lesiones en las vías respiratorias, el corazón, los pulmones, la visión, los nervios o músculos, y en casos sumamente raros, la muerte.  Mii médico me ha explicado otras opciones de atención disponibles para mí (alternativas).  Pacientes embarazadas (“epidural”):    Entiendo que los riesgos de recibir keyshawn inyección epidural (medicamento que se aplica en la espalda para ayudar a controlar el dolor suzette el parto), incluyen picazón, presión arterial baja, dificultad para orinar, dolor de anthony o disminución en el ritmo del corazón del bebé. Los riesgos muy poco comunes incluyen infección, hemorragia, convulsiones, ritmo cardíaco irregular y lesión del nervio.  Anestesia regional (“columna vertebral”, “epidural” y “bloqueo de los nervios”):  Entiendo que hay complicaciones poco frecuentes tor posibles, e incluyen dolor de anthony, sangrado, infección, convulsiones, ritmo cardíaco irregular y la lesión del nervio.  .   Puedo cambiar de opinión acerca de recibir servicios de anestesia en cualquier momento antes de que se me administre el medicamento.         Paciente (o representante) Firma/Relación con el paciente  Fecha  Hora  Patient Signature/Signature of Responsible person Date Time           Nombre del intérprete (en winston devan)  Idioma/Organización  Hora  Name of  Language/Organization Time          Firma del anestesiólogo Fecha  Hora  Signature of anesthesiologist Date Time    He hablado del procedimiento y la información anterior con el paciente (o el representante del paciente) y respondí cory preguntas. El paciente o winston representante ha aceptado recibir los servicios de anestesia.         Testigo Fecha  Hora  Witness Date Time  He verificado que la firma es la del paciente o del representante del paciente, y que se firmó antes del procedimiento.    Nombre del paciente: Armani Romero  Fec. de Nac.:  10/7/1950                 En letra de imprenta: October 5, 2024  Expediente médico No. [id number]                         Página 1 de 2  ----------ANESTHESIA CONSENT----------